# Patient Record
Sex: MALE | Race: WHITE | NOT HISPANIC OR LATINO | URBAN - METROPOLITAN AREA
[De-identification: names, ages, dates, MRNs, and addresses within clinical notes are randomized per-mention and may not be internally consistent; named-entity substitution may affect disease eponyms.]

---

## 2017-01-04 ENCOUNTER — EMERGENCY (EMERGENCY)
Facility: HOSPITAL | Age: 67
LOS: 0 days | Discharge: HOME | End: 2017-01-04

## 2017-06-27 DIAGNOSIS — R55 SYNCOPE AND COLLAPSE: ICD-10-CM
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
## 2017-06-27 DIAGNOSIS — Z91.81 HISTORY OF FALLING: ICD-10-CM

## 2017-06-27 DIAGNOSIS — I10 ESSENTIAL (PRIMARY) HYPERTENSION: ICD-10-CM

## 2017-06-27 DIAGNOSIS — S00.81XA ABRASION OF OTHER PART OF HEAD, INITIAL ENCOUNTER: ICD-10-CM
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
## 2017-06-27 DIAGNOSIS — I48.91 UNSPECIFIED ATRIAL FIBRILLATION: ICD-10-CM

## 2017-06-27 DIAGNOSIS — E03.9 HYPOTHYROIDISM, UNSPECIFIED: ICD-10-CM

## 2023-02-03 ENCOUNTER — APPOINTMENT (OUTPATIENT)
Dept: ORTHOPEDIC SURGERY | Facility: CLINIC | Age: 73
End: 2023-02-03
Payer: MEDICARE

## 2023-02-03 VITALS — BODY MASS INDEX: 23.1 KG/M2 | HEIGHT: 74 IN | WEIGHT: 180 LBS

## 2023-02-03 PROBLEM — Z00.00 ENCOUNTER FOR PREVENTIVE HEALTH EXAMINATION: Status: ACTIVE | Noted: 2023-02-03

## 2023-02-03 PROCEDURE — 99203 OFFICE O/P NEW LOW 30 MIN: CPT

## 2023-02-03 PROCEDURE — 72110 X-RAY EXAM L-2 SPINE 4/>VWS: CPT

## 2023-02-03 NOTE — IMAGING
[de-identified] :   Pleasant middle-aged gentleman he is able to stand without assistance but he has to utilize his center gravity to stand up of leaning forward.  \par \par Lumbar spine:  Well-healed surgical scars.  No tenderness palpation about surgical sites.  No gross deformity.  No tenderness along the bony prominences.  Motor strength about his lower extremities does demonstrate some mild decrease is with 5-out of 5 strength and his ileus psoas quadriceps and hamstring muscles.  He is unable to heel or toe walk secondary to balance issues.  Motor strength in these muscle groups is the same.  Grossly intact sensation over the anterior aspect of his legs and thighs subjective decreased sensation on the plantar aspect of his foot.\par \par Radiographs:\par Lumbar spine (AP, lateral, obliques):  Retained plate fixation between L3 and L5 with evidence of fusion mass most likely between L3-L4.  There vertical syndesmophytes noted between L1 and L2.  Facet arthritis is noted about the length of lumbar spine.  Some flattening of the normal lordosis.  Generalized disc space narrowing throughout the whole lumbar spine.

## 2023-02-03 NOTE — ASSESSMENT
[FreeTextEntry1] :   73-year-old gentleman with extensive neuro surgical history with symptoms consistent with spinal stenosis.  I would like him evaluated by Dr. Karel Brito and possibly Neurology.  Patient would probably benefit from an MRI with gadolinium enhancement.  Try to make arrangements for the study prior to his visit with Dr. Brito .  I explained my concerns and the plan to the patient.  Answered questions to his satisfaction and agrees with the plan.

## 2023-02-03 NOTE — HISTORY OF PRESENT ILLNESS
[de-identified] :  73-year-old gentleman presents this office for evaluation of bilateral lower extremity weakness.  Is extensive in neuro surgical history.  Previously has undergone laminectomies and lumbar spine fusions for weakness in his lower extremities by Dr. Marciano Michaels and subsequent by Dr. Vargas Rendon.  Patient reports that recently he is developing weakness is lower extremities along with lasting history of decreased sensation about the lateral aspect of his feet.  He walks without a steady gait.  He notes that he had similar symptoms prior to his last surgery.  He is concerned about recurrence.

## 2023-02-13 ENCOUNTER — APPOINTMENT (OUTPATIENT)
Dept: ORTHOPEDIC SURGERY | Facility: CLINIC | Age: 73
End: 2023-02-13
Payer: MEDICARE

## 2023-02-13 PROCEDURE — 99214 OFFICE O/P EST MOD 30 MIN: CPT

## 2023-02-13 NOTE — PHYSICAL EXAM
[de-identified] : TTP midline spine and paraspinal musculature \par Strength                                         \par Hip flexor\par   Right: 5/5; Left: 5/5                             \par Knee extensor  \par   Right: 5/5; Left: 5/5                     \par Ankle dorsiflexion\par   Right: 5/5; Left: 5/5                  \par EHL        \par   Right: 5/5; Left: 5/5                                \par Ankle plantarflexion    \par   Right: 5/5; Left: 5/5\par \par Sensation\par L1\par   Right: 2/2; Left: 2/2\par L2\par   Right: 2/2; Left: 2/2\par L3\par   Right: 2/2; Left: 2/2\par L4\par   Right: 2/2; Left: 2/2\par L5\par   Right: 2/2; Left: 2/2\par S1\par   Right: 2/2; Left: 2/2\par \par Reflexes\par Patella\par   Right: 2+; Left 2+\par Achilles\par   Right: 2+; Left 2+\par Clonus\par  Right: absent; L: absent\par

## 2023-02-13 NOTE — DISCUSSION/SUMMARY
[de-identified] : 73-year-old male with symptoms of recurrent spinal stenosis.  At this point I will order an MRI of the patient's lumbar spine for evaluation.  I did discuss with the patient that if his symptoms do not significantly interfere with his day-to-day I do not necessarily recommend a revision posterior lumbar surgery.  I will see the patient back after the MRI to go over the results and see what the next step will be.

## 2023-02-13 NOTE — DATA REVIEWED
[FreeTextEntry1] : I reviewed AP lateral oblique lumbar x-rays done on 2/3/2023.  This demonstrates an instrumented fusion at L3-5.  There are some mild adjacent segment disease at L2-3 there is also degenerative disc disease at L5-S1.

## 2023-02-13 NOTE — HISTORY OF PRESENT ILLNESS
[de-identified] : 73-year-old female presents with lower extremity symptoms.  Of note the patient in 2008 had a laminectomy and fusion done.  Prior to the surgery he had sensation of his legs giving way and weakness in his legs.  That resolved however it is recurring.  At this point it does not bother him significantly enough for surgery however he is worried about it getting worse.  He states that in the morning he wakes up and he has trouble walking because his legs feel heavy and weak.  As the day goes that improves.  He also has numbness and tingling in the morning but that goes away as the day goes.  He denies loss of bladder or bowel.  He does not do physical therapy would not like to do physical therapy.  He states that he is fairly active otherwise.  He denies a significant amount of low back pain.  He has no advanced imaging.

## 2023-02-23 ENCOUNTER — APPOINTMENT (OUTPATIENT)
Dept: MRI IMAGING | Facility: CLINIC | Age: 73
End: 2023-02-23
Payer: MEDICARE

## 2023-02-23 PROCEDURE — 72158 MRI LUMBAR SPINE W/O & W/DYE: CPT | Mod: MH

## 2023-03-08 ENCOUNTER — APPOINTMENT (OUTPATIENT)
Dept: PAIN MANAGEMENT | Facility: CLINIC | Age: 73
End: 2023-03-08
Payer: MEDICARE

## 2023-03-08 ENCOUNTER — APPOINTMENT (OUTPATIENT)
Dept: ORTHOPEDIC SURGERY | Facility: CLINIC | Age: 73
End: 2023-03-08
Payer: MEDICARE

## 2023-03-08 VITALS — WEIGHT: 180 LBS | BODY MASS INDEX: 23.1 KG/M2 | HEIGHT: 74 IN

## 2023-03-08 DIAGNOSIS — R29.898 OTHER SYMPTOMS AND SIGNS INVOLVING THE MUSCULOSKELETAL SYSTEM: ICD-10-CM

## 2023-03-08 DIAGNOSIS — M54.16 RADICULOPATHY, LUMBAR REGION: ICD-10-CM

## 2023-03-08 PROCEDURE — 99214 OFFICE O/P EST MOD 30 MIN: CPT

## 2023-03-08 PROCEDURE — 99204 OFFICE O/P NEW MOD 45 MIN: CPT

## 2023-03-08 NOTE — PHYSICAL EXAM
[de-identified] : BACK - tenderness into the lumbar paraspinals. ROM Restricted. Pain with flexion. Positive SLR on the right.

## 2023-03-08 NOTE — HISTORY OF PRESENT ILLNESS
[de-identified] : 73-year-old male presents to me with neurogenic claudication.  He is here for me to review the MRI of his lumbar spine he had October 23, 2023.  The patient states his symptoms have been getting worse.  When he walks his legs feel dead to him.  The longer he walks the worse it is.  When he sits down the symptoms resolved.  When he starts walking the symptoms resume.  At the supermarket he leans forward on a shopping cart he can walk longer distances.  He has not tried any injections yet.

## 2023-03-08 NOTE — DATA REVIEWED
[FreeTextEntry1] : MRI of the lumbar spine taken on 02/23/2023 Impression: The patient is status post laminectomy at L3 and L4 with posterior fixation with pedicle screws within the bodies of L3 and L5. A small central disc herniation is present at L3-4. Mild to moderate lumbar stenosis is seen at L2-3.

## 2023-03-08 NOTE — DATA REVIEWED
[FreeTextEntry1] : I reviewed the patient's MRI of his lumbar spine from February 23, 2023 done at our facility.  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  Open spinal canal at the levels of his prior laminectomy.

## 2023-03-08 NOTE — HISTORY OF PRESENT ILLNESS
[FreeTextEntry1] : HISTORY OF PRESENT ILLNESS: Mr. Shah is a 73 year old male complaining of weakness and tiredness in his legs.  He states he feels unsteady when he ambulates for any appreciable amount of time.  He states he also has pain which radiates into the right lower extremity.  He does also gives history of having prior lumbar spine surgery and fusion.  He recently saw a spine surgeon and no current surgical interventions are planned.\par \par The pain started after no inciting event.  The patient has had this pain for 1 year.  Patient describes the pain as moderate.  During the last month the pain has been less than 30% of time with symptoms worsening night. Pain described as shooting.  Pain is increased with lying down.  Bowel or bladder habits have not changed.\par  \par ACTIVITIES: Patient could walk 4 blocks before the pain starts.  Patient can sit many hours before pain starts.  Patient can stand 1 hour before pain starts. Patient uses no assistive walking device at this time.  Patient has difficulty doing outside work or shopping, participating in recreational activities and exercising at this time.\par \par PRIOR PAIN TREATMENTS:  No prior pain treatments noted.\par \par Prior Pain Medications:  None mentioned.\par

## 2023-03-08 NOTE — DISCUSSION/SUMMARY
[de-identified] : 73-year-old male presents with adjacent segment disease and spinal stenosis at L2-3 causing neurogenic claudication.  The patient states that his leg pain bothers him more than any of his back pain.  I discussed with the patient that the surgical option would be a lumbar laminectomy and extension of his fusion to L2.  I would like the patient to try an epidural steroid injection prior to proceeding with any kind of surgical intervention.  The patient agrees that he will follow-up with Dr. Hernandez.  I will also send the patient for full-length scoliosis x-rays.

## 2023-03-08 NOTE — ASSESSMENT
[FreeTextEntry1] : 73 year old male presenting with severe lumbar radicular pain. Patient is presenting with radicular pain with impairment in ADLs and functionality.  The pain has not responded to conservative care, including medications, stretching, as well as active modalities, such as physical therapy.  Imaging studies as well as physical exam findings corroborate the symptomatology and radicular pain.  We will proceed with an epidural at this point. I will proceed with a Bilateral L2-3 transforaminal epidural steroid injection with sedation. He is currently taking Xarelto. In order to proceed with this injection, I will reach out to his cardiologist for clearance. I will also have the patient receive a Lumbar LSO brace to reduce pain by restricting mobility of the trunk. Follow up in 6 weeks will be made for reassessment. I have explained the findings to the patient and all questions have been answered. \par \par Bilateral L2-3 transforaminal epidural steroid injection with sedation.\par \par Patient had a MRI that shows a radicular component along with pain referred into the lower extremity. Patient has trialed rehab (Home exercise, physical therapy or chiropractic care) and medications I will schedule a L2-3 SNRI. \par \par Risk, benefits, pros and cons of procedure were explained to the patient using models and diagrams and their questions were answered. \par \par \par The patient has severe anxiety of procedures that necessitates monitored anesthesia care (MAC). The procedure performed will be close to major nerves, arteries, and spinal cord and/or joint structures. Due to the proximity of these structures, we need the patient to be still during the procedure.  With the help of MAC, this will be safely achieved and decrease the risk of any complications.\par \par Patient has pain in spinal movement along with weakness in extension and flexion. I will put in for a lumbar brace with lateral supports to be worn no more than 4 hours a day and 2 hours in a row to decrease facet and disc load, to decrease pain, increase activity, increase function, to reduce pain by restricting mobility of the trunk, to facilitate healing of the spine and related Soft tissues and to support weak spinal muscles used to help the patient with rehab and home exercise program stressing walking.  Other exercises discussed include swimming, elliptical , recumbent bike, Dru chi and Yoga. Use things that heat like hot shower or icy heat before rehab and exercising and at the beginning of the day, and ice (ice in a bag never directly on the skin) after activity and at the end of the day.\par \par Entered by Corie Sparks, acting as scribe for Dr. Hernandez.\par  \par The documentation recorded by the scribe, in my presence, accurately reflects the service I personally performed, and the decisions made by me with my edits as appropriate.\par  \par Best Regards, \par Pierre Hernandez MD \par Board Certified, Anesthesiology \par Board Certified, Pain Medicine\par \par

## 2023-03-10 ENCOUNTER — OUTPATIENT (OUTPATIENT)
Dept: OUTPATIENT SERVICES | Facility: HOSPITAL | Age: 73
LOS: 1 days | End: 2023-03-10
Payer: MEDICARE

## 2023-03-10 ENCOUNTER — RESULT REVIEW (OUTPATIENT)
Age: 73
End: 2023-03-10

## 2023-03-10 DIAGNOSIS — R29.898 OTHER SYMPTOMS AND SIGNS INVOLVING THE MUSCULOSKELETAL SYSTEM: ICD-10-CM

## 2023-03-10 DIAGNOSIS — Z00.8 ENCOUNTER FOR OTHER GENERAL EXAMINATION: ICD-10-CM

## 2023-03-10 PROCEDURE — 72110 X-RAY EXAM L-2 SPINE 4/>VWS: CPT | Mod: 26

## 2023-03-10 PROCEDURE — 72110 X-RAY EXAM L-2 SPINE 4/>VWS: CPT

## 2023-03-11 DIAGNOSIS — R29.898 OTHER SYMPTOMS AND SIGNS INVOLVING THE MUSCULOSKELETAL SYSTEM: ICD-10-CM

## 2023-04-05 ENCOUNTER — APPOINTMENT (OUTPATIENT)
Dept: ORTHOPEDIC SURGERY | Facility: CLINIC | Age: 73
End: 2023-04-05

## 2023-04-06 ENCOUNTER — APPOINTMENT (OUTPATIENT)
Dept: PAIN MANAGEMENT | Facility: CLINIC | Age: 73
End: 2023-04-06
Payer: MEDICARE

## 2023-04-06 PROCEDURE — 00630 ANES PX LUMBAR REGION NOS: CPT | Mod: QZ,P3

## 2023-04-06 PROCEDURE — 72100 X-RAY EXAM L-S SPINE 2/3 VWS: CPT

## 2023-04-06 PROCEDURE — 94761 N-INVAS EAR/PLS OXIMETRY MLT: CPT | Mod: 59

## 2023-04-06 PROCEDURE — 64483 NJX AA&/STRD TFRM EPI L/S 1: CPT | Mod: 50

## 2023-04-06 PROCEDURE — 93040 RHYTHM ECG WITH REPORT: CPT | Mod: 59

## 2023-04-06 PROCEDURE — 93770 DETERMINATION VENOUS PRESS: CPT

## 2023-04-06 NOTE — PROCEDURE
[FreeTextEntry1] : SELECTIVE TRANSFORAMINAL LUMBAR L2-3 EPIDURAL NERVE ROOT INJECTION UNDER FLUOROSCOPY [FreeTextEntry3] : SELECTIVE TRANSFORAMINAL LUMBAR L2-3 EPIDURAL NERVE ROOT INJECTION UNDER FLUOROSCOPY\par \par \par \par \par Date:  2023\par \par Patient: Theodore Shah\par \par :  1950\par Preoperative Diagnosis: Lumbar Radiculopathy \par \par \par \par \par \par Procedure: \par 1. Selective Bilateral L2-3 Transforaminal Lumbar Epidural Nerve Root Injection under Fluoroscopy\par 2. Epidurography\par 3. Fluoroscopic guidance and localization of needle \par \par \par \par \par \par Physician: Pierre Hernandez M.D.\par Anesthesiologist/CRNA:  Mr. Hicks \par Anesthesia: MAC/cold spray, see nurses note. IV Sedation versed 1mg, fentanyl 100 mcg\par Medical Necessity:  Failure of conservative management.\par Consent:  Though unusual, the possible complications including infection, bleeding, nerve damage, hospital admission, stroke, pneumothorax, death or failure of the procedure are theoretically possible. The patient was educated about the of the procedure and alternative therapies. All questions were answered and the patient freely gave consent to proceed.\par Indication for Fluoroscopy:  This procedure requires the precise placement of the spinal needle into the epidural space.  It is the only way to accurately and safely perform the injection.\par \par \par \par \par PROCEDURE NOTE: \par After obtaining written consent, the patient was then positioned on the fluoroscopy table in the prone position with a pillow beneath the pelvis to reduce lumbar lordosis. The lumbar area was prepped with chloraprep solution and draped in the usual manner. A time out was performed. The fluoroscope was used to identify the L3///L4///L5 vertebral body on the AP projection. It was then rotated into an oblique projection until the superior articulating process of the L3 (inferior) vertebra is projected beneath the 6 o'clock position of the L2 (superior) vertebrae. The 22 gauge 3.5inch needle was inserted in the skin at a point overlying the superior articulating process of the inferior vertebra and aimed for the 6 o'clock position of the superior vertebrae's pedicle.  After the needle contacted bone, a lateral projection was obtained to insure that the needle tip was in proximity with the vertebral body. Paresthesias were not noted.  One ml of Omnipaque 240 was injected and a neurogram was obtained. Following demonstration of the neurogram, 1 ml of Preservative free normal saline and 1 ml of dexamethasone (10mg) was injected. The small volume and relatively high concentration was chosen to preserve selectivity and diagnostic value of the injection. There was no CSF nor heme identified. The contralateral side was injected in identical fashion.\par \par \par Epidurogram: The nerve root was observed in its outline on the neurogram. Distal and proximal spread was noted.\par Findings: Lumbar Spine AP and oblique views with x-ray degenerative changes noted.\par \par Complications: none.  \par \par Disposition: I have examined the patient and there are no new physical findings since original presentation. The patient was discharged home with a . The discharge instruction sheet was given to the patient. Motor function was intact.\par \par Comment: 1st TFESI today, depending on effectiveness would schedule 2nd TFESI in 1-2 weeks vs caudal epidural steroid vs follow up in office. Call if any problems\par \par \par \par \par This document was signed by:\par Pierre Hernandez MD  \par Board Certified, Anesthesiology  \par Board Certified, Pain Medicine  \par \par

## 2023-04-13 ENCOUNTER — APPOINTMENT (OUTPATIENT)
Dept: PAIN MANAGEMENT | Facility: CLINIC | Age: 73
End: 2023-04-13

## 2023-04-13 ENCOUNTER — APPOINTMENT (OUTPATIENT)
Dept: PAIN MANAGEMENT | Facility: CLINIC | Age: 73
End: 2023-04-13
Payer: MEDICARE

## 2023-04-13 PROCEDURE — 93770 DETERMINATION VENOUS PRESS: CPT

## 2023-04-13 PROCEDURE — 64483 NJX AA&/STRD TFRM EPI L/S 1: CPT | Mod: 50

## 2023-04-13 PROCEDURE — 93040 RHYTHM ECG WITH REPORT: CPT | Mod: 59

## 2023-04-13 PROCEDURE — 72100 X-RAY EXAM L-S SPINE 2/3 VWS: CPT

## 2023-04-13 PROCEDURE — 99152Z: CUSTOM

## 2023-04-13 NOTE — PROCEDURE
[FreeTextEntry1] : SELECTIVE TRANSFORAMINAL LUMBAR L2-3 EPIDURAL NERVE ROOT INJECTION UNDER FLUOROSCOPY [FreeTextEntry3] : SELECTIVE TRANSFORAMINAL LUMBAR L2-3 EPIDURAL NERVE ROOT INJECTION UNDER FLUOROSCOPY\par \par \par \par \par Date:  2023\par \par Patient: Theodore Shah\par \par :  1950\par Preoperative Diagnosis: Lumbar Radiculopathy \par \par \par \par \par \par Procedure: \par 1. Selective Bilateral L2-3 Transforaminal Lumbar Epidural Nerve Root Injection under Fluoroscopy\par 2. Epidurography\par 3. Fluoroscopic guidance and localization of needle \par \par \par \par \par \par Physician: Pierre Hernandez M.D.\par Anesthesia: See nurses note. MAC/ cold spray. IV Sedation versed 1mg, fentanyl 100mcg\par Medical Necessity:  Failure of conservative management.\par Consent:  Though unusual, the possible complications including infection, bleeding, nerve damage, hospital admission, stroke, pneumothorax, death or failure of the procedure are theoretically possible. The patient was educated about the of the procedure and alternative therapies. All questions were answered and the patient freely gave consent to proceed.\par Indication for Fluoroscopy:  This procedure requires the precise placement of the spinal needle into the epidural space.  It is the only way to accurately and safely perform the injection.\par \par \par \par \par PROCEDURE NOTE: \par After obtaining written consent, the patient was then positioned on the fluoroscopy table in the prone position with a pillow beneath the pelvis to reduce lumbar lordosis. The lumbar area was prepped with chloraprep solution and draped in the usual manner. A time out was performed. The fluoroscope was used to identify the L3///L4///L5 vertebral body on the AP projection. It was then rotated into an oblique projection until the superior articulating process of the L3 (inferior) vertebra is projected beneath the 6 o'clock position of the L2 (superior) vertebrae. The 22 gauge 3.5inch needle was inserted in the skin at a point overlying the superior articulating process of the inferior vertebra and aimed for the 6 o'clock position of the superior vertebrae's pedicle.  After the needle contacted bone, a lateral projection was obtained to insure that the needle tip was in proximity with the vertebral body. Paresthesias were not noted.  One ml of Omnipaque 240 was injected and a neurogram was obtained. Following demonstration of the neurogram, 1 ml of Preservative free normal saline and 1 ml of dexamethasone (10mg) was injected. The small volume and relatively high concentration was chosen to preserve selectivity and diagnostic value of the injection. There was no CSF nor heme identified. The contralateral side was injected in identical fashion.\par \par \par Epidurogram: The nerve root was observed in its outline on the neurogram. Distal and proximal spread was noted.\par Findings: Lumbar Spine AP and oblique views with x-ray degenerative changes noted.\par \par Complications: none.  \par \par Disposition: I have examined the patient and there are no new physical findings since original presentation. The patient was discharged home with a . The discharge instruction sheet was given to the patient. Motor function was intact.\par \par Comment: 2ndTFESI today, depending on effectiveness would schedule 3rd TFESI in 1-2 weeks vs caudal epidural steroid vs follow up in office. Call if any problems\par \par \par \par \par This document was signed by:\par Pierre Hernandez MD  \par Board Certified, Anesthesiology  \par Board Certified, Pain Medicine  \par \par

## 2023-04-14 ENCOUNTER — APPOINTMENT (OUTPATIENT)
Dept: ORTHOPEDIC SURGERY | Facility: CLINIC | Age: 73
End: 2023-04-14
Payer: MEDICARE

## 2023-04-14 PROCEDURE — 99213 OFFICE O/P EST LOW 20 MIN: CPT

## 2023-04-14 NOTE — HISTORY OF PRESENT ILLNESS
[de-identified] : 73-year-old male returns for follow-up.  He has had 2 injections and has gotten some relief albeit incomplete in terms of his lower extremity symptoms.  Overall he is pleased with the progress.  He has 1/3 injection scheduled and he would like to see what happens in terms of his pain relief before deciding on any kind of surgical intervention.  I agree I think this is a reasonable plan.

## 2023-04-14 NOTE — DISCUSSION/SUMMARY
[de-identified] : 73-year-old male with adjacent segment disease and spinal stenosis causing neurogenic claudication L2-3.  I will see the patient back in about 6 weeks to check on his progress.  If he is having substantial relief after the completion of his injections we will hold off on any Surgical intervention.  If he still having difficulty walking in a lot of pain then we will likely proceed to surgery.  He will see me sooner if symptoms worsen sooner.

## 2023-04-27 ENCOUNTER — APPOINTMENT (OUTPATIENT)
Dept: PAIN MANAGEMENT | Facility: CLINIC | Age: 73
End: 2023-04-27
Payer: MEDICARE

## 2023-04-27 PROCEDURE — 93040 RHYTHM ECG WITH REPORT: CPT | Mod: XS

## 2023-04-27 PROCEDURE — 62323 NJX INTERLAMINAR LMBR/SAC: CPT

## 2023-04-27 PROCEDURE — 72100 X-RAY EXAM L-S SPINE 2/3 VWS: CPT

## 2023-04-27 PROCEDURE — 93770 DETERMINATION VENOUS PRESS: CPT | Mod: 59

## 2023-04-27 PROCEDURE — 00630 ANES PX LUMBAR REGION NOS: CPT | Mod: QZ,P3

## 2023-04-27 PROCEDURE — 94761 N-INVAS EAR/PLS OXIMETRY MLT: CPT

## 2023-04-27 NOTE — PROCEDURE
[FreeTextEntry1] : Interlaminar Lumbar Epidural Steroid Injection  [FreeTextEntry3] : Interlaminar Lumbar Epidural Steroid Injection \par  \par \par Date:  2023\par \par Patient: Theodore Shah \par \par :  1950\par \par \par Preoperative Diagnosis: Lumbar Radiculopathy \par Postoperative Diagnosis: Lumbar Radiculopathy \par \par \par \par \par \par Procedure: \par 1. Interlaminar L2-3 Lumbar Epidural Injection under fluoroscopy\par 2. Epidurography\par 3. Fluoroscopic guidance and localization of needle \par \par \par \par \par \par Physician: Pierre Hernandez M.D.\par Anesthesiologist/CRNA:  Ms. Clark\par Anesthesia: See nurses note/MAC/cold spray IV Sedation versed 1mg, fentanyl 100mcg \par Medical Necessity:  Failure of conservative management.\par Consent:  Though unusual, the possible complications including infection, bleeding, nerve damage, hospital admission, stroke, pneumothorax, death or failure of the procedure are theoretically possible. The patient was educated about the of the procedure and alternative therapies. All questions were answered and the patient freely gave consent to proceed.\par Indication for Fluoroscopy:  This procedure requires the precise placement of the spinal needle into the epidural space.  It is the only way to accurately and safely perform the injection.\par \par \par \par \par PROCEDURE NOTE:  After obtaining written consent, in the sitting position an IV was placed in the upper extremity by the CRNA. The patient was placed in the prone position. The lumbosacral region was prepped with chloraprep and draped in usual sterile fashion. A time out was performed.  The L2-3 interspace was identified using fluoroscopy. The skin was infiltrated with lidocaine 2% -- 2mL for subcutaneous analgesia. The epidural space was identified using a 17g touhy needle with a midline approach using a loss of resistance technique. 2mL omnipaque was used to define the space. A solution of 6ml of preservative-free sterile saline and 1ml of depomedrol 80mg was infused in 1mL increments slowly with minimal pressure on the syringe into the epidural space. The procedure was tolerated well. There was no evidence of CSF, paresthesias, or heme.  \par  \par Epidurogram: Distal and proximal spread was noted.\par \par Findings: Lumbar Spine AP and oblique views with x-ray degenerative changes noted.\par \par Complications: none.  \par \par Disposition: I have examined the patient and there are no new physical findings since original presentation. The patient was discharged home with a . The discharge instruction sheet was given to the patient. Motor function was intact.\par \par Comment: 1st Interlaminar LISA today, depending on effectiveness would schedule 2nd Interlaminar in 1-2 weeks vs caudal epidural steroid vs follow up in office. Call if any problems\par \par \par \par \par This document was signed by: \par \par Pierre Hernandez MD  \par Board Certified, Anesthesiology  \par Board Certified, Pain Medicine  \par \par \par

## 2023-05-04 ENCOUNTER — APPOINTMENT (OUTPATIENT)
Dept: PAIN MANAGEMENT | Facility: CLINIC | Age: 73
End: 2023-05-04
Payer: MEDICARE

## 2023-05-04 PROCEDURE — 94761 N-INVAS EAR/PLS OXIMETRY MLT: CPT

## 2023-05-04 PROCEDURE — 93040 RHYTHM ECG WITH REPORT: CPT | Mod: 59

## 2023-05-04 PROCEDURE — 93770 DETERMINATION VENOUS PRESS: CPT

## 2023-05-04 PROCEDURE — 00630 ANES PX LUMBAR REGION NOS: CPT | Mod: QZ,P3

## 2023-05-04 PROCEDURE — 62323 NJX INTERLAMINAR LMBR/SAC: CPT

## 2023-05-04 PROCEDURE — 72100 X-RAY EXAM L-S SPINE 2/3 VWS: CPT

## 2023-05-04 NOTE — PROCEDURE
[FreeTextEntry1] : Interlaminar Lumbar Epidural Steroid Injection [FreeTextEntry3] : Date:  2023\par \par Patient: Theodore Shah\par \par :  1950\par \par \par \par \par \par Preoperative Diagnosis: Lumbar Radiculopathy\par Postoperative Diagnosis: Lumbar Radiculopathy\par \par \par \par Procedure:\par 1. Interlaminar L2-3 Lumbar Epidural Injection under fluoroscopy\par 2. Epidurography\par 3. Fluoroscopic guidance and localization of needle\par \par \par \par Physician: Pierre Hernandez M.D.\par Anesthesiologist/CRNA: Mr. Hicks \par Anesthesia: MAC, Versed 2mg, Fentanyl 150 mcg\par Medical Necessity:  Failure of conservative management.\par Consent:  Though unusual, the possible complications including infection, bleeding, nerve damage, hospital admission, stroke, pneumothorax, death or failure of the procedure are theoretically possible. The patient was educated about the of the procedure and alternative therapies. All questions were answered and the patient freely gave consent to proceed.\par Indication for Fluoroscopy:  This procedure requires the precise placement of the spinal needle into the epidural space.  It is the only way to accurately and safely perform the injection.\par \par \par \par PROCEDURE NOTE:  After obtaining written consent, in the sitting position an IV was placed in the upper extremity by the CRNA. The patient was placed in the prone position. The lumbosacral region was prepped with betadine and draped in usual sterile fashion. A time out was performed.  The L2-3 interspace was identified using fluoroscopy. The skin was infiltrated with lidocaine 2% -- 2mL for subcutaneous analgesia. The epidural space was identified using a 17g touhy needle with a midline approach using a loss of resistance technique. 2mL omnipaque was used to define the space. A solution of 6ml of preservative-free sterile saline and 1ml of depomedrol 80mg was infused in 1mL increments slowly with minimal pressure on the syringe into the epidural space. The procedure was tolerated well. There was no evidence of CSF, paresthesias, or heme.  \par  \par Epidurogram: Distal and proximal spread was noted.\par \par Findings: Lumbar Spine AP and oblique views with x-ray degenerative changes noted.\par \par Complications: none. \par \par Disposition: I have examined the patient and there are no new physical findings since original presentation. The patient was discharged home with a . The discharge instruction sheet was given to the patient. Motor function was intact.\par \par Comment: 2nd Interlaminar LISA today, depending on effectiveness would schedule 3rd Interlaminar in 1-2 weeks vs caudal epidural steroid vs follow up in office. Call if any problems\par \par \par \par \par \par Pierre Hernandez MD \par Board Certified, Anesthesiology \par Board Certified, Pain Medicine \par \par

## 2023-05-18 ENCOUNTER — APPOINTMENT (OUTPATIENT)
Dept: PAIN MANAGEMENT | Facility: CLINIC | Age: 73
End: 2023-05-18
Payer: MEDICARE

## 2023-05-18 VITALS — WEIGHT: 180 LBS | HEIGHT: 74 IN | BODY MASS INDEX: 23.1 KG/M2

## 2023-05-18 DIAGNOSIS — M96.1 POSTLAMINECTOMY SYNDROME, NOT ELSEWHERE CLASSIFIED: ICD-10-CM

## 2023-05-18 PROCEDURE — 99214 OFFICE O/P EST MOD 30 MIN: CPT

## 2023-05-18 NOTE — ASSESSMENT
[FreeTextEntry1] : 73 year old male who essentially failed lumbar epidural steroid injections.  He is scheduled to see spine surgery.  I will have him follow up with me in 2 months for reassessment.\par \par \par Entered by Corie Sparks, acting as scribe for Dr. Hernandez.\par  \par The documentation recorded by the scribe, in my presence, accurately reflects the service I personally performed, and the decisions made by me with my edits as appropriate.\par  \par Best Regards, \par Pierre Hernandez MD \par Board Certified, Anesthesiology \par Board Certified, Pain Medicine\par \par

## 2023-05-18 NOTE — HISTORY OF PRESENT ILLNESS
[FreeTextEntry1] : HISTORY OF PRESENT ILLNESS: Mr. Shah is a 73 year old male complaining of weakness and tiredness in his legs.  He states he feels unsteady when he ambulates for any appreciable amount of time.  He states he also has pain which radiates into the right lower extremity.  He does also gives history of having prior lumbar spine surgery and fusion.  He recently saw a spine surgeon and no current surgical interventions are planned.\par \par The pain started after no inciting event.  The patient has had this pain for 1 year.  Patient describes the pain as moderate.  During the last month the pain has been less than 30% of time with symptoms worsening night. Pain described as shooting.  Pain is increased with lying down.  Bowel or bladder habits have not changed.\par  \par ACTIVITIES: Patient could walk 4 blocks before the pain starts.  Patient can sit many hours before pain starts.  Patient can stand 1 hour before pain starts. Patient uses no assistive walking device at this time.  Patient has difficulty doing outside work or shopping, participating in recreational activities and exercising at this time.\par \par PRIOR PAIN TREATMENTS:  No prior pain treatments noted.\par \par Prior Pain Medications:  None mentioned.\par \par TODAY: Since last visit, he underwent a L2-3 interlaminar epidural steroid injection on 5-4-2023 He states the injections helped minimally with his leg weakness.  He continues today with severe spasms and weakness in the leg with ambulation.  He states the pain is present daily and with any sort of activity.  He is very frustrated by his symptoms.

## 2023-05-18 NOTE — PHYSICAL EXAM
[de-identified] : BACK - tenderness into the lumbar paraspinals. ROM Restricted. Pain with flexion. Positive SLR on the right.

## 2023-06-02 ENCOUNTER — APPOINTMENT (OUTPATIENT)
Dept: ORTHOPEDIC SURGERY | Facility: CLINIC | Age: 73
End: 2023-06-02
Payer: MEDICARE

## 2023-06-02 PROCEDURE — 72100 X-RAY EXAM L-S SPINE 2/3 VWS: CPT

## 2023-06-02 PROCEDURE — 99214 OFFICE O/P EST MOD 30 MIN: CPT

## 2023-06-02 NOTE — DISCUSSION/SUMMARY
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  He has failed conservative care.  I discussed surgical options with him.  I recommend an L2-3 laminectomy.  The goal of the surgery would be relief of his claudicant symptoms.  He would still have back pain.  He is more bothered by the claudicant symptoms in the back pain.  I discussed risks including infection, wound healing, adjacent segment disease, iatrogenic instability, need for revision surgery, hematoma, bleeding, dural tear, nerve injury, loss of bladder or bowel, organ damage, blood clots, and even death.  I also discussed with the patient that doing a decompression would have a faster recovery with less risks than doing an extension of fusion which would require revision of his old hardware.  I did discuss with him that it is possible that he develops recurrent stenosis and instability and if that is the case then we can always go back to do an instrumented fusion on him however he has no instability on flexion-extension right now and I think a decompression would provide him the maximal benefit given his age and given the recovery from a fusion.  I am also ordering a CT scan of the lumbar spine to look at the bony anatomy.  The patient would like to proceed with surgery.  We will book him for [facility].  I provided him with the standardized information packet.

## 2023-06-02 NOTE — HISTORY OF PRESENT ILLNESS
[de-identified] : 73-year-old male returns for follow-up.  He has had a few injections and some of his pain improved however his symptoms started to recur.  His most significant symptoms are the heaviness and weakness he feels in his legs.  When he walks his weakness in his legs worsens.  When he sits down it improves.  He likes to lean forward at the supermarket and that helps his symptoms.  This is how he felt before his prior surgery.  It is interfering with his day-to-day life.

## 2023-06-06 ENCOUNTER — RESULT REVIEW (OUTPATIENT)
Age: 73
End: 2023-06-06

## 2023-06-06 ENCOUNTER — OUTPATIENT (OUTPATIENT)
Dept: OUTPATIENT SERVICES | Facility: HOSPITAL | Age: 73
LOS: 1 days | End: 2023-06-06
Payer: MEDICARE

## 2023-06-06 VITALS
TEMPERATURE: 99 F | WEIGHT: 199.96 LBS | HEIGHT: 74 IN | OXYGEN SATURATION: 99 % | HEART RATE: 94 BPM | DIASTOLIC BLOOD PRESSURE: 57 MMHG | SYSTOLIC BLOOD PRESSURE: 97 MMHG | RESPIRATION RATE: 16 BRPM

## 2023-06-06 DIAGNOSIS — Z98.890 OTHER SPECIFIED POSTPROCEDURAL STATES: Chronic | ICD-10-CM

## 2023-06-06 DIAGNOSIS — Z01.818 ENCOUNTER FOR OTHER PREPROCEDURAL EXAMINATION: ICD-10-CM

## 2023-06-06 DIAGNOSIS — M54.16 RADICULOPATHY, LUMBAR REGION: ICD-10-CM

## 2023-06-06 DIAGNOSIS — Z98.1 ARTHRODESIS STATUS: Chronic | ICD-10-CM

## 2023-06-06 LAB
ALBUMIN SERPL ELPH-MCNC: 4.7 G/DL — SIGNIFICANT CHANGE UP (ref 3.5–5.2)
ALP SERPL-CCNC: 65 U/L — SIGNIFICANT CHANGE UP (ref 30–115)
ALT FLD-CCNC: 16 U/L — SIGNIFICANT CHANGE UP (ref 0–41)
ANION GAP SERPL CALC-SCNC: 16 MMOL/L — HIGH (ref 7–14)
APTT BLD: 37.2 SEC — SIGNIFICANT CHANGE UP (ref 27–39.2)
AST SERPL-CCNC: 27 U/L — SIGNIFICANT CHANGE UP (ref 0–41)
BASOPHILS # BLD AUTO: 0.08 K/UL — SIGNIFICANT CHANGE UP (ref 0–0.2)
BASOPHILS NFR BLD AUTO: 1.1 % — HIGH (ref 0–1)
BILIRUB SERPL-MCNC: 0.6 MG/DL — SIGNIFICANT CHANGE UP (ref 0.2–1.2)
BUN SERPL-MCNC: 14 MG/DL — SIGNIFICANT CHANGE UP (ref 10–20)
CALCIUM SERPL-MCNC: 9.8 MG/DL — SIGNIFICANT CHANGE UP (ref 8.4–10.5)
CHLORIDE SERPL-SCNC: 92 MMOL/L — LOW (ref 98–110)
CO2 SERPL-SCNC: 23 MMOL/L — SIGNIFICANT CHANGE UP (ref 17–32)
CREAT SERPL-MCNC: 1.3 MG/DL — SIGNIFICANT CHANGE UP (ref 0.7–1.5)
EGFR: 58 ML/MIN/1.73M2 — LOW
EOSINOPHIL # BLD AUTO: 0.15 K/UL — SIGNIFICANT CHANGE UP (ref 0–0.7)
EOSINOPHIL NFR BLD AUTO: 2.1 % — SIGNIFICANT CHANGE UP (ref 0–8)
GLUCOSE SERPL-MCNC: 79 MG/DL — SIGNIFICANT CHANGE UP (ref 70–99)
HCT VFR BLD CALC: 38.1 % — LOW (ref 42–52)
HGB BLD-MCNC: 13.3 G/DL — LOW (ref 14–18)
IMM GRANULOCYTES NFR BLD AUTO: 0.3 % — SIGNIFICANT CHANGE UP (ref 0.1–0.3)
INR BLD: 1.28 RATIO — SIGNIFICANT CHANGE UP (ref 0.65–1.3)
LYMPHOCYTES # BLD AUTO: 1.74 K/UL — SIGNIFICANT CHANGE UP (ref 1.2–3.4)
LYMPHOCYTES # BLD AUTO: 24.6 % — SIGNIFICANT CHANGE UP (ref 20.5–51.1)
MCHC RBC-ENTMCNC: 34.9 G/DL — SIGNIFICANT CHANGE UP (ref 32–37)
MCHC RBC-ENTMCNC: 34.9 PG — HIGH (ref 27–31)
MCV RBC AUTO: 100 FL — HIGH (ref 80–94)
MONOCYTES # BLD AUTO: 0.9 K/UL — HIGH (ref 0.1–0.6)
MONOCYTES NFR BLD AUTO: 12.7 % — HIGH (ref 1.7–9.3)
MRSA PCR RESULT.: NEGATIVE — SIGNIFICANT CHANGE UP
NEUTROPHILS # BLD AUTO: 4.18 K/UL — SIGNIFICANT CHANGE UP (ref 1.4–6.5)
NEUTROPHILS NFR BLD AUTO: 59.2 % — SIGNIFICANT CHANGE UP (ref 42.2–75.2)
NRBC # BLD: 0 /100 WBCS — SIGNIFICANT CHANGE UP (ref 0–0)
PLATELET # BLD AUTO: 209 K/UL — SIGNIFICANT CHANGE UP (ref 130–400)
PMV BLD: 10.2 FL — SIGNIFICANT CHANGE UP (ref 7.4–10.4)
POTASSIUM SERPL-MCNC: 4.4 MMOL/L — SIGNIFICANT CHANGE UP (ref 3.5–5)
POTASSIUM SERPL-SCNC: 4.4 MMOL/L — SIGNIFICANT CHANGE UP (ref 3.5–5)
PROT SERPL-MCNC: 7.7 G/DL — SIGNIFICANT CHANGE UP (ref 6–8)
PROTHROM AB SERPL-ACNC: 14.7 SEC — HIGH (ref 9.95–12.87)
RBC # BLD: 3.81 M/UL — LOW (ref 4.7–6.1)
RBC # FLD: 13.2 % — SIGNIFICANT CHANGE UP (ref 11.5–14.5)
SODIUM SERPL-SCNC: 131 MMOL/L — LOW (ref 135–146)
WBC # BLD: 7.07 K/UL — SIGNIFICANT CHANGE UP (ref 4.8–10.8)
WBC # FLD AUTO: 7.07 K/UL — SIGNIFICANT CHANGE UP (ref 4.8–10.8)

## 2023-06-06 PROCEDURE — 99214 OFFICE O/P EST MOD 30 MIN: CPT | Mod: 25

## 2023-06-06 PROCEDURE — 85730 THROMBOPLASTIN TIME PARTIAL: CPT

## 2023-06-06 PROCEDURE — 85025 COMPLETE CBC W/AUTO DIFF WBC: CPT

## 2023-06-06 PROCEDURE — 87641 MR-STAPH DNA AMP PROBE: CPT

## 2023-06-06 PROCEDURE — 87640 STAPH A DNA AMP PROBE: CPT

## 2023-06-06 PROCEDURE — 86900 BLOOD TYPING SEROLOGIC ABO: CPT

## 2023-06-06 PROCEDURE — 71046 X-RAY EXAM CHEST 2 VIEWS: CPT

## 2023-06-06 PROCEDURE — 85610 PROTHROMBIN TIME: CPT

## 2023-06-06 PROCEDURE — 93010 ELECTROCARDIOGRAM REPORT: CPT

## 2023-06-06 PROCEDURE — 80053 COMPREHEN METABOLIC PANEL: CPT

## 2023-06-06 PROCEDURE — 36415 COLL VENOUS BLD VENIPUNCTURE: CPT

## 2023-06-06 PROCEDURE — 71046 X-RAY EXAM CHEST 2 VIEWS: CPT | Mod: 26

## 2023-06-06 PROCEDURE — 93005 ELECTROCARDIOGRAM TRACING: CPT

## 2023-06-06 PROCEDURE — 86850 RBC ANTIBODY SCREEN: CPT

## 2023-06-06 PROCEDURE — 86901 BLOOD TYPING SEROLOGIC RH(D): CPT

## 2023-06-06 NOTE — H&P PST ADULT - NEUROLOGICAL COMMENTS
Taylor Hoffman   1943   [id number]       Reason for Visit/Chief Complaint:    Chief Complaint   Patient presents with   • Office Visit     Malignant lymphoma, large cell, diffuse (CMS/HCC)  (primary encounter diagnosis)  Generalized edema    Cancer Staging Summary for Lani Hoffman     Malignant lymphoma, large cell, diffuse (CMS/HCC)     Stage Date Classification Stage Status    5/23/19 Clinical Stage III (Diffuse large B-cell lymphoma) Signed by Marcellus Johnson MD on 12/14/20               Assessment & Plan     Problem   Malignant Lymphoma, Large Cell, Diffuse (Cms/Hcc)     Initially evaluated on May 22, 2019 at [facility]. Diagnosed with diffuse B-cell non-Hodgkin’s lymphoma with CD5 expression on May 24, 2019. She was admitted to hospital on May 17, 2019 after sustaining a fall without loss of consciousness.  At the time, she was noted to be markedly hypercalcemic with evidence of renal insufficiency. Responded to treatment with IV fluids and pamidronate.     She has a remote history of breast cancer diagnosed in the 1990s and treated with lumpectomy.  She underwent a lymph node biopsy and axillary lymph node dissection with 4 out of 24 lymph nodes followed by post-operative chemotherapy. Of note, she persisted on getting a chemotherapy regimen which would not cause hair loss  which suggests that she did not receive an anthracycline.     CT of C/A/P obtained on May 22, 2019 showed extensive left supraclavicular, left axillary mediastinal, and retroperitoneal lymphadenopathy findings concerning for lymphoma with metastatic disease not excluded. There is also mention of a compression fracture of T8, a small right sided pleural effusion, and lobulation of right kidney. A lymph node biopsy was performed on May 24 and a bone marrow biopsy on May 28.  Morphologic findings on lymph node core biopsy was consistent with diffuse large B-cell lymphoma. Per immunohistochemistry, tumor cells  expressed CD5 in addition to CD20 raising the possibility of a transformed mantle cell lymphoma. However, subsequent testing for 11;14  translocation was negative, inconsistent with mantle cell lymphoma. FISH studies were also negative for C-MYC  rearrangement indicating that this was not a double hit lymphoma. Bone marrow biopsy did not show involvement by lymphoma. LDH was elevated, she had B symptoms, ECOG 2 on presentation, stage III. IPI=4     Additional imaging studies performed included ultrasound of kidneys which suggested that the right lower pole asymmetry likely represented an exophytic cyst. MRI of brain and thoracic spine showed no abnormalities other than chronic compression fracture of the T8 vertebra and lymphadenopathy provided on previous CT. Bone study on May 22 did not show any lytic lesions. Bone scan performed May 20 was mostly unremarkable. Lumbar puncture performed May 30 also did not show evidence of lymphoma involvement.      She went on to initiate chemotherapy with the R-CHOP regimen on June 4, 2019. Initial concerns regarding cumulative anthracycline dosing were weighed against need to receive effective chemotherapy and the predominant evidence based on her clinical course describing that she likely didn’t receive an anthracycline. Echocardiogram performed on May 26, 2019 showed EF of 55-60% and grade 1 diastolic dysfunction. She received Neulasta on June 7, 2019.     Echocardiogram performed on 07/12 showed stable to improved EF of 60 to 65% and grade 1 diastolic dysfunction.      CT C/A/P on 07/30 demonstrated significant interval improvement of left supraclavicular, axillary and mediastinal adenopathy as well as retroperitoneal and pelvic adenopathy. No new lesions or lymph node enlargement.     Echocardiogram on 08/21 with stable EF of 60 to 65%.      Completed R-CHOP regimen on 09/17.      PET/CT on 10/08/19 revealed no scintigraphic finding concerning for FDG avid malignancy.  Uptake where there is a fracture deformity left 3rd rib and left 6th rib.     CT C/A/P on 5/7/20 demonstrated no evidence of disease no masses or lymphadenopathy, no evidence of disease     CT again performed 12/8/20 without evidence of disease     Malignant Lymphoma, Large Cell, Diffuse. She is currently 2.5 years out from her original diagnosis. No palpable lymphadenopathy on examination. Lab results from 11/8/21 unremarkable (see below). Will order a repeat CT C/A/P scan today in consideration of her generalized edema.     Generalized edema. Currently following with PT/OT for compression sleeves and stockings. Will order an echocardiogram to evaluate her cardiac function, as well as a repeat CT scan as outlined above.    I will see her in 2 weeks for follow up pending scan/echo results. Consider rescheduling if no abnormal findings    Subjective     She presents today for a follow up. Her brother is communicating via telephone today. Currently using compression sleeves for lymphedema. History of breast cancer with right lumpectomy. She has had swelling in her left arm since chemotherapy. Did have issues with LE lymphedema from varicose vein surgery. Notes that she has been gaining weight recently.     Current Outpatient Medications   Medication Sig   • ascorbic acid (VITAMIN C) 1000 MG tablet Take 1 tablet by mouth daily. Do not start before January 7, 2021.   • zinc sulfate (ZINCATE) 220 (50 Zn) MG capsule Take 1 capsule by mouth daily (with breakfast). Do not start before January 7, 2021.   • polyvinyl alcohol (Artificial Tears) 1.4 % ophthalmic solution Place 1 drop into both eyes 2 times daily.   • fluocinonide (LIDEX) 0.05 % cream Apply 1 application topically 2 times daily.   • losartan (COZAAR) 50 MG tablet Take 50 mg by mouth every morning.   • polyethylene glycol (MiraLax) 17 g packet Take 17 g by mouth daily. Stir and dissolve powder in any 4 to 8 ounces of beverage, then drink.   • docusate sodium  (COLACE) 100 MG capsule Take 100 mg by mouth 2 times daily.   • ergocalciferol (DRISDOL) 1.25 mg (50,000 units) capsule Take 1.25 mg by mouth 1 day a week. On Wednesday   • acetaminophen (TYLENOL) 325 MG tablet Take 650 mg by mouth every 6 hours as needed for Pain.   • diphenhydrAMINE (BENADRYL) 12.5 MG/5ML liquid Take 12.5 mg by mouth every morning. Swish and spit   • ibuprofen (MOTRIN) 200 MG tablet Take 200 mg by mouth 2 times daily as needed for Pain.   • magnesium hydroxide (MILK OF MAGNESIA) 400 MG/5ML suspension Take 15 mLs by mouth daily as needed for Constipation.   • ondansetron (ZOFRAN) 4 MG tablet Take 4 mg by mouth every 4 hours as needed for Nausea.   • prochlorperazine (COMPAZINE) 10 MG tablet Take 10 mg by mouth every 6 hours as needed for Nausea or Vomiting.   • senna (SENOKOT) 8.6 MG tablet Take 1 tablet by mouth at bedtime as needed for Constipation.   • allopurinol (ZYLOPRIM) 300 MG tablet Take 300 mg by mouth every morning.    • ammonium lactate (AMLACTIN) 12 % lotion Apply 1 application topically 2 times daily as needed for Dry Skin.    • aspirin (ECOTRIN) 81 MG EC tablet Take 81 mg by mouth daily (before breakfast).    • apixaBAN (ELIQUIS) 2.5 MG Tab Take 2.5 mg by mouth 2 times daily.    • esomeprazole (NEXIUM) 40 MG capsule Take 40 mg by mouth daily (before breakfast).   • fluticasone (FLONASE) 50 MCG/ACT nasal spray Spray 1 spray in each nostril daily.    • furosemide (LASIX) 40 MG tablet Take 40 mg by mouth daily.    • hydroCORTisone (ANUSOL-HC) 2.5 % rectal cream Apply 1 application topically daily. To hip   • lidocaine (LIDODERM) 5 % patch Place 1 patch onto the skin every 24 hours. Remove patch 12 hours after applying   • loratadine (CLARITIN) 10 MG tablet Take 10 mg by mouth daily.   • metFORMIN (GLUCOPHAGE) 500 MG tablet Take 500 mg by mouth 2 times daily (with meals).   • Multiple Vitamins-Minerals (MULTIVITAMIN ADULT PO) Take 1 tablet by mouth every morning.    • oxybutynin  (DITROPAN-XL) 5 MG 24 hr tablet Take 5 mg by mouth daily.   • tetrahydrozoline 0.05 % ophthalmic solution Place 1 drop into both eyes daily.    • Thiamine HCl (VITAMIN B-1) 100 MG tablet Take 100 mg by mouth daily.     No current facility-administered medications for this visit.       Review of Systems as above, otherwise unremarkable.    Objective     Last Recorded Vitals    Vitals:    11/22/21 1410   BP: 119/74   Pulse: 62   Resp: 16   Temp: 97.2 °F (36.2 °C)     ECOG   ECOG Performance Status        Physical Exam    Constitutional:       Appearance: Normal appearance. No acute distress  HENT:      Nose: No congestion or rhinorrhea.   Eyes:      General: No scleral icterus.     Extraocular Movements: Extraocular movements intact.   Neck:      Musculoskeletal: Neck supple. No RAVEN  Cardiovascular:      Rate and Rhythm: Regular rhythm. Normal rate  Pulmonary:      Effort: Pulmonary effort is normal. No respiratory distress.      Breath sounds: No wheezing.   Abdominal:      General: There is no distension.      Tenderness: There is no abdominal tenderness. There is no guarding.   No hepatosplenomegaly  Musculoskeletal:         General: Edema noted in both upper and lower extremities without pitting. She wears compression sleeves on both arms and compression stockings on both legs.   Lymphadenopathy:      Cervical: No cervical adenopathy.      Upper Body:      Right upper body: No supraclavicular or axillary adenopathy.      Left upper body: No supraclavicular or axillary adenopathy.      Lower Body: No right inguinal adenopathy. No left inguinal adenopathy.   Skin:     Coloration: Skin is not jaundiced.      Findings: No rash.   Neurological:      General: No focal deficit present.      Mental Status: Alert and oriented to person, place, and time.   Psychiatric:         Mood and Affect: Mood normal.         Behavior: Behavior normal.       Labs     11/8/21:  WBC=6.5  Hgb=11.8  Fdg=951  70.7% neutrophils  13.3%  lymphocytes  Cr=0.9  AST=19  ALT=14    GOT/AST (Units/L)   Date Value   01/06/2021 24     GPT/ALT (Units/L)   Date Value   01/06/2021 43     No results found for: GGTP  Alkaline Phosphatase (Units/L)   Date Value   01/06/2021 83     Bilirubin, Total (mg/dL)   Date Value   01/06/2021 0.6     Sodium (mmol/L)   Date Value   01/06/2021 137     Potassium (mmol/L)   Date Value   01/06/2021 4.1     Chloride (mmol/L)   Date Value   01/06/2021 101     Carbon Dioxide (mmol/L)   Date Value   01/06/2021 30     BUN (mg/dL)   Date Value   01/06/2021 36 (H)     Creatinine (mg/dL)   Date Value   01/06/2021 0.71     Glucose (mg/dL)   Date Value   01/06/2021 89       Imaging    Echocardiogram - 12/27/21  Component   Ventricular Rate EKG/Min (BPM)   98    Atrial Rate (BPM)   98    SD-Interval (MSEC)   138    QRS-Interval (MSEC)   82    QT-Interval (MSEC)   352    QTc   449    P Axis (Degrees)   -3    R Axis (Degrees)   -24    T Axis (Degrees)   37    REPORT TEXT   Normal sinus rhythm   with sinus arrhythmia   Normal ECG   When compared with ECG of   30-JUN-2020 12:55,   Vent. rate   has increased   BY  36 BPM   Confirmed by JF SUE MD ([id number]) on 12/27/2020 8:25:22 PM        Recent PHQ 2/9 Score    PHQ 2:  Date Adult PHQ 2 Score Adult PHQ 2 Interpretation   11/22/2021 0 No further screening needed       PHQ 9:          On 11/22/2021, Lucius MÁRQUEZ scribed the services personally performed by Dr. Johnson.    The documentation recorded by the scribe accurately and completely reflects the service(s) I personally performed and the decisions made by me.   Marcellus Johnson MD     weakness in lower extremity

## 2023-06-06 NOTE — H&P PST ADULT - REASON FOR ADMISSION
72 y/o male presents to PAST in preparation for lumbar2-3 laminectomy in [facility] under GA with Dr. Brito on 6/13/23

## 2023-06-06 NOTE — H&P PST ADULT - HISTORY OF PRESENT ILLNESS
74 y/o male presents to PAST in preparation for lumbar2-3 laminectomy in [facility] under GA with Dr. Brito on 6/13/23    Pt states that he had noticed that he has weakness in his lower extremities for the past 6-8 months. He has had 4 spinal injections with no relief. Pt now for above procedure due to symptoms.  Pt with a hx of afib followed by cardio Dr. Vargas.     PATIENT CURRENTLY DENIES CHEST PAIN  SHORTNESS OF BREATH  PALPITATIONS,  DYSURIA, OR UPPER RESPIRATORY INFECTION IN PAST 2 WEEKS  EXERCISE  TOLERANCE  4-5 FLIGHT OF STAIRS  WITHOUT SHORTNESS OF BREATH, but difficulty now due to leg weakness   pt denies any covid s/s,12/22  pt advised self quarantine till day of procedure  Patient verbalized understanding of instructions and was given the opportunity to ask questions and have them answered.  As per patient, this is their complete medical and surgical history, including medications both prescribed or over the counter.  written and verbal instructions with teach back on chlorhexidine shampoo provided,  pt verbalized understanding with returned demonstration    Anesthesia Alert  NO--Difficult Airway  Yes--History of neck surgery or radiation-C4-6 spinal fusion, radiation to left side of face  NO--Limited ROM of neck  NO--History of Malignant hyperthermia  NO--Personal or family history of Pseudocholinesterase deficiency.  NO--Prior Anesthesia Complication  NO--Latex Allergy  NO--Loose teeth  NO--History of Rheumatoid Arthritis  NO--RAJ  Yes--Bleeding risk, Xarelto   NO--Other_____  Mallampati airway: Class III    Opioid Risk Assessment Tool (Male)       Family history of substance abuse            Alcohol (3)            Illegal Drugs (3)            Prescription drugs (4)       Personal history of substance abuse            Alcohol (3)            Illegal Drugs (4)            Prescription drugs (5)       Age between 16-45 (1)       History of preadolescent sexual abuse (0)       Psychological disease (ADD, ADHD, OCD, Bipolar Disorder, Schizophrenia, Depression) (1)    Scoring Totals:  Low Risk (0-3)     Lumbar radiculopathy    Encounter for other preprocedural examination    [id number]; [id number]    [id number]

## 2023-06-06 NOTE — H&P PST ADULT - NSICDXPASTSURGICALHX_GEN_ALL_CORE_FT
PAST SURGICAL HISTORY:  H/O knee surgery     H/O laminectomy     H/O spinal fusion     Status post Mohs surgery

## 2023-06-06 NOTE — H&P PST ADULT - ATTENDING COMMENTS
Patient continues to have lower extremity weakness and heaviness when he walks  5/5 strength bilateral LE  Labs and pre op note reviewed   Proceed to OR  Risks and benefits discussed

## 2023-06-06 NOTE — H&P PST ADULT - NSICDXPASTMEDICALHX_GEN_ALL_CORE_FT
PAST MEDICAL HISTORY:  Afib     H/O low back pain     History of BPH     Hypertension     Hypothyroid     Skin cancer

## 2023-06-07 DIAGNOSIS — M54.16 RADICULOPATHY, LUMBAR REGION: ICD-10-CM

## 2023-06-07 DIAGNOSIS — Z01.818 ENCOUNTER FOR OTHER PREPROCEDURAL EXAMINATION: ICD-10-CM

## 2023-06-08 ENCOUNTER — OUTPATIENT (OUTPATIENT)
Dept: OUTPATIENT SERVICES | Facility: HOSPITAL | Age: 73
LOS: 1 days | End: 2023-06-08
Payer: MEDICARE

## 2023-06-08 DIAGNOSIS — Z98.890 OTHER SPECIFIED POSTPROCEDURAL STATES: Chronic | ICD-10-CM

## 2023-06-08 DIAGNOSIS — Z98.1 ARTHRODESIS STATUS: Chronic | ICD-10-CM

## 2023-06-08 DIAGNOSIS — Z02.9 ENCOUNTER FOR ADMINISTRATIVE EXAMINATIONS, UNSPECIFIED: ICD-10-CM

## 2023-06-08 PROBLEM — E03.9 HYPOTHYROIDISM, UNSPECIFIED: Chronic | Status: ACTIVE | Noted: 2023-06-06

## 2023-06-08 PROBLEM — I10 ESSENTIAL (PRIMARY) HYPERTENSION: Chronic | Status: ACTIVE | Noted: 2023-06-06

## 2023-06-08 PROBLEM — Z87.438 PERSONAL HISTORY OF OTHER DISEASES OF MALE GENITAL ORGANS: Chronic | Status: ACTIVE | Noted: 2023-06-06

## 2023-06-08 PROBLEM — C44.90 UNSPECIFIED MALIGNANT NEOPLASM OF SKIN, UNSPECIFIED: Chronic | Status: ACTIVE | Noted: 2023-06-06

## 2023-06-08 PROBLEM — I48.91 UNSPECIFIED ATRIAL FIBRILLATION: Chronic | Status: ACTIVE | Noted: 2023-06-06

## 2023-06-08 PROBLEM — Z87.39 PERSONAL HISTORY OF OTHER DISEASES OF THE MUSCULOSKELETAL SYSTEM AND CONNECTIVE TISSUE: Chronic | Status: ACTIVE | Noted: 2023-06-06

## 2023-06-08 LAB — SODIUM SERPL-SCNC: 141 MMOL/L — SIGNIFICANT CHANGE UP (ref 135–146)

## 2023-06-08 PROCEDURE — 36415 COLL VENOUS BLD VENIPUNCTURE: CPT

## 2023-06-08 PROCEDURE — 84295 ASSAY OF SERUM SODIUM: CPT

## 2023-06-09 DIAGNOSIS — Z02.9 ENCOUNTER FOR ADMINISTRATIVE EXAMINATIONS, UNSPECIFIED: ICD-10-CM

## 2023-06-12 NOTE — ASU PATIENT PROFILE, ADULT - FALL HARM RISK - UNIVERSAL INTERVENTIONS
Bed in lowest position, wheels locked, appropriate side rails in place/Call bell, personal items and telephone in reach/Instruct patient to call for assistance before getting out of bed or chair/Non-slip footwear when patient is out of bed/[place] to call system/Physically safe environment - no spills, clutter or unnecessary equipment/Purposeful Proactive Rounding/Room/bathroom lighting operational, light cord in reach

## 2023-06-12 NOTE — ASU PATIENT PROFILE, ADULT - PATIENT REPRESENTATIVE: ( YOU CAN CHOOSE ANY PERSON THAT CAN ASSIST YOU WITH YOUR HEALTH CARE PREFERENCES, DOES NOT HAVE TO BE A SPOUSE, IMMEDIATE FAMILY OR SIGNIFICANT OTHER/PARTNER)
Polysubstance abuse Thrombocytosis Thrombocytosis Polysubstance abuse Polysubstance abuse Polysubstance abuse Thrombocytosis Thrombocytosis Declines

## 2023-06-12 NOTE — ASU PATIENT PROFILE, ADULT - PRO INTERPRETER NEED 2
English Hydroxychloroquine Pregnancy And Lactation Text: This medication has been shown to cause fetal harm but it isn't assigned a Pregnancy Risk Category. There are small amounts excreted in breast milk.

## 2023-06-13 ENCOUNTER — TRANSCRIPTION ENCOUNTER (OUTPATIENT)
Age: 73
End: 2023-06-13

## 2023-06-13 ENCOUNTER — APPOINTMENT (OUTPATIENT)
Dept: ORTHOPEDIC SURGERY | Facility: HOSPITAL | Age: 73
End: 2023-06-13

## 2023-06-13 ENCOUNTER — INPATIENT (INPATIENT)
Facility: HOSPITAL | Age: 73
LOS: 1 days | Discharge: HOME | End: 2023-06-15

## 2023-06-13 VITALS
DIASTOLIC BLOOD PRESSURE: 95 MMHG | SYSTOLIC BLOOD PRESSURE: 123 MMHG | OXYGEN SATURATION: 99 % | RESPIRATION RATE: 16 BRPM | TEMPERATURE: 98 F | HEART RATE: 86 BPM

## 2023-06-13 DIAGNOSIS — M54.16 RADICULOPATHY, LUMBAR REGION: ICD-10-CM

## 2023-06-13 DIAGNOSIS — Z98.1 ARTHRODESIS STATUS: ICD-10-CM

## 2023-06-13 DIAGNOSIS — Z98.1 ARTHRODESIS STATUS: Chronic | ICD-10-CM

## 2023-06-13 DIAGNOSIS — Z85.828 PERSONAL HISTORY OF OTHER MALIGNANT NEOPLASM OF SKIN: ICD-10-CM

## 2023-06-13 DIAGNOSIS — Z98.890 OTHER SPECIFIED POSTPROCEDURAL STATES: Chronic | ICD-10-CM

## 2023-06-13 DIAGNOSIS — M48.062 SPINAL STENOSIS, LUMBAR REGION WITH NEUROGENIC CLAUDICATION: ICD-10-CM
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
## 2023-06-13 DIAGNOSIS — G97.41 ACCIDENTAL PUNCTURE OR LACERATION OF DURA DURING A PROCEDURE: ICD-10-CM

## 2023-06-13 DIAGNOSIS — L90.5 SCAR CONDITIONS AND FIBROSIS OF SKIN: ICD-10-CM

## 2023-06-13 LAB
BASOPHILS # BLD AUTO: 0 K/UL — SIGNIFICANT CHANGE UP (ref 0–0.2)
BASOPHILS NFR BLD AUTO: 0 % — SIGNIFICANT CHANGE UP (ref 0–1)
EOSINOPHIL # BLD AUTO: 0 K/UL — SIGNIFICANT CHANGE UP (ref 0–0.7)
EOSINOPHIL NFR BLD AUTO: 0 % — SIGNIFICANT CHANGE UP (ref 0–8)
HCT VFR BLD CALC: 32.3 % — LOW (ref 42–52)
HGB BLD-MCNC: 11.3 G/DL — LOW (ref 14–18)
LYMPHOCYTES # BLD AUTO: 0.16 K/UL — LOW (ref 1.2–3.4)
LYMPHOCYTES # BLD AUTO: 2.6 % — LOW (ref 20.5–51.1)
MANUAL SMEAR VERIFICATION: SIGNIFICANT CHANGE UP
MCHC RBC-ENTMCNC: 35 G/DL — SIGNIFICANT CHANGE UP (ref 32–37)
MCHC RBC-ENTMCNC: 35.1 PG — HIGH (ref 27–31)
MCV RBC AUTO: 100.3 FL — HIGH (ref 80–94)
MONOCYTES # BLD AUTO: 0 K/UL — LOW (ref 0.1–0.6)
MONOCYTES NFR BLD AUTO: 0 % — LOW (ref 1.7–9.3)
NEUTROPHILS # BLD AUTO: 6.04 K/UL — SIGNIFICANT CHANGE UP (ref 1.4–6.5)
NEUTROPHILS NFR BLD AUTO: 96.5 % — HIGH (ref 42.2–75.2)
PLAT MORPH BLD: NORMAL — SIGNIFICANT CHANGE UP
PLATELET # BLD AUTO: 156 K/UL — SIGNIFICANT CHANGE UP (ref 130–400)
PMV BLD: 10 FL — SIGNIFICANT CHANGE UP (ref 7.4–10.4)
POIKILOCYTOSIS BLD QL AUTO: SLIGHT — SIGNIFICANT CHANGE UP
POLYCHROMASIA BLD QL SMEAR: SLIGHT — SIGNIFICANT CHANGE UP
RBC # BLD: 3.22 M/UL — LOW (ref 4.7–6.1)
RBC # FLD: 12.9 % — SIGNIFICANT CHANGE UP (ref 11.5–14.5)
RBC BLD AUTO: ABNORMAL
VARIANT LYMPHS # BLD: 0.9 % — SIGNIFICANT CHANGE UP (ref 0–5)
WBC # BLD: 6.26 K/UL — SIGNIFICANT CHANGE UP (ref 4.8–10.8)
WBC # FLD AUTO: 6.26 K/UL — SIGNIFICANT CHANGE UP (ref 4.8–10.8)

## 2023-06-13 RX ORDER — SENNA PLUS 8.6 MG/1
2 TABLET ORAL AT BEDTIME
Refills: 0 | Status: DISCONTINUED | OUTPATIENT
Start: 2023-06-13 | End: 2023-06-15

## 2023-06-13 RX ORDER — SODIUM CHLORIDE 9 MG/ML
1000 INJECTION INTRAMUSCULAR; INTRAVENOUS; SUBCUTANEOUS
Refills: 0 | Status: COMPLETED | OUTPATIENT
Start: 2023-06-13 | End: 2023-06-14

## 2023-06-13 RX ORDER — APREPITANT 80 MG/1
40 CAPSULE ORAL ONCE
Refills: 0 | Status: DISCONTINUED | OUTPATIENT
Start: 2023-06-13 | End: 2023-06-13

## 2023-06-13 RX ORDER — TAMSULOSIN HYDROCHLORIDE 0.4 MG/1
1 CAPSULE ORAL
Refills: 0 | DISCHARGE

## 2023-06-13 RX ORDER — OLMESARTAN MEDOXOMIL 5 MG/1
1 TABLET, FILM COATED ORAL
Refills: 0 | DISCHARGE

## 2023-06-13 RX ORDER — HYDROMORPHONE HYDROCHLORIDE 2 MG/ML
0.25 INJECTION INTRAMUSCULAR; INTRAVENOUS; SUBCUTANEOUS
Refills: 0 | Status: DISCONTINUED | OUTPATIENT
Start: 2023-06-13 | End: 2023-06-15

## 2023-06-13 RX ORDER — MORPHINE SULFATE 50 MG/1
2 CAPSULE, EXTENDED RELEASE ORAL
Refills: 0 | Status: DISCONTINUED | OUTPATIENT
Start: 2023-06-13 | End: 2023-06-15

## 2023-06-13 RX ORDER — LOSARTAN POTASSIUM 100 MG/1
50 TABLET, FILM COATED ORAL DAILY
Refills: 0 | Status: DISCONTINUED | OUTPATIENT
Start: 2023-06-13 | End: 2023-06-15

## 2023-06-13 RX ORDER — ACETAMINOPHEN 500 MG
1000 TABLET ORAL ONCE
Refills: 0 | Status: COMPLETED | OUTPATIENT
Start: 2023-06-13 | End: 2023-06-13

## 2023-06-13 RX ORDER — HYDROMORPHONE HYDROCHLORIDE 2 MG/ML
0.5 INJECTION INTRAMUSCULAR; INTRAVENOUS; SUBCUTANEOUS
Refills: 0 | Status: DISCONTINUED | OUTPATIENT
Start: 2023-06-13 | End: 2023-06-15

## 2023-06-13 RX ORDER — RIVAROXABAN 15 MG-20MG
1 KIT ORAL
Refills: 0 | DISCHARGE

## 2023-06-13 RX ORDER — TAMSULOSIN HYDROCHLORIDE 0.4 MG/1
0.4 CAPSULE ORAL AT BEDTIME
Refills: 0 | Status: DISCONTINUED | OUTPATIENT
Start: 2023-06-13 | End: 2023-06-15

## 2023-06-13 RX ORDER — METOPROLOL TARTRATE 50 MG
1 TABLET ORAL
Refills: 0 | DISCHARGE

## 2023-06-13 RX ORDER — KETOROLAC TROMETHAMINE 30 MG/ML
15 SYRINGE (ML) INJECTION EVERY 8 HOURS
Refills: 0 | Status: DISCONTINUED | OUTPATIENT
Start: 2023-06-13 | End: 2023-06-15

## 2023-06-13 RX ORDER — ACETAMINOPHEN 500 MG
1000 TABLET ORAL ONCE
Refills: 0 | Status: DISCONTINUED | OUTPATIENT
Start: 2023-06-13 | End: 2023-06-13

## 2023-06-13 RX ORDER — MEPERIDINE HYDROCHLORIDE 50 MG/ML
12.5 INJECTION INTRAMUSCULAR; INTRAVENOUS; SUBCUTANEOUS ONCE
Refills: 0 | Status: DISCONTINUED | OUTPATIENT
Start: 2023-06-13 | End: 2023-06-15

## 2023-06-13 RX ORDER — LEVOTHYROXINE SODIUM 125 MCG
125 TABLET ORAL DAILY
Refills: 0 | Status: DISCONTINUED | OUTPATIENT
Start: 2023-06-13 | End: 2023-06-15

## 2023-06-13 RX ORDER — APREPITANT 80 MG/1
40 CAPSULE ORAL ONCE
Refills: 0 | Status: COMPLETED | OUTPATIENT
Start: 2023-06-13 | End: 2023-06-13

## 2023-06-13 RX ORDER — SODIUM CHLORIDE 9 MG/ML
1000 INJECTION, SOLUTION INTRAVENOUS
Refills: 0 | Status: DISCONTINUED | OUTPATIENT
Start: 2023-06-13 | End: 2023-06-14

## 2023-06-13 RX ORDER — RIVAROXABAN 15 MG-20MG
20 KIT ORAL DAILY
Refills: 0 | Status: DISCONTINUED | OUTPATIENT
Start: 2023-06-13 | End: 2023-06-13

## 2023-06-13 RX ORDER — RIVAROXABAN 15 MG-20MG
20 KIT ORAL DAILY
Refills: 0 | Status: DISCONTINUED | OUTPATIENT
Start: 2023-06-14 | End: 2023-06-15

## 2023-06-13 RX ORDER — OXYCODONE HYDROCHLORIDE 5 MG/1
5 TABLET ORAL EVERY 6 HOURS
Refills: 0 | Status: DISCONTINUED | OUTPATIENT
Start: 2023-06-13 | End: 2023-06-15

## 2023-06-13 RX ORDER — METOPROLOL TARTRATE 50 MG
25 TABLET ORAL DAILY
Refills: 0 | Status: DISCONTINUED | OUTPATIENT
Start: 2023-06-13 | End: 2023-06-15

## 2023-06-13 RX ORDER — ONDANSETRON 8 MG/1
4 TABLET, FILM COATED ORAL ONCE
Refills: 0 | Status: DISCONTINUED | OUTPATIENT
Start: 2023-06-13 | End: 2023-06-15

## 2023-06-13 RX ORDER — DIGOXIN 250 MCG
125 TABLET ORAL DAILY
Refills: 0 | Status: DISCONTINUED | OUTPATIENT
Start: 2023-06-13 | End: 2023-06-15

## 2023-06-13 RX ORDER — CEFAZOLIN SODIUM 1 G
2000 VIAL (EA) INJECTION EVERY 8 HOURS
Refills: 0 | Status: COMPLETED | OUTPATIENT
Start: 2023-06-13 | End: 2023-06-14

## 2023-06-13 RX ORDER — HYDROMORPHONE HYDROCHLORIDE 2 MG/ML
0.5 INJECTION INTRAMUSCULAR; INTRAVENOUS; SUBCUTANEOUS EVERY 4 HOURS
Refills: 0 | Status: DISCONTINUED | OUTPATIENT
Start: 2023-06-13 | End: 2023-06-15

## 2023-06-13 RX ORDER — ONDANSETRON 8 MG/1
4 TABLET, FILM COATED ORAL EVERY 6 HOURS
Refills: 0 | Status: DISCONTINUED | OUTPATIENT
Start: 2023-06-13 | End: 2023-06-15

## 2023-06-13 RX ORDER — DIGOXIN 250 MCG
1 TABLET ORAL
Refills: 0 | DISCHARGE

## 2023-06-13 RX ORDER — LEVOTHYROXINE SODIUM 125 MCG
1 TABLET ORAL
Refills: 0 | DISCHARGE

## 2023-06-13 RX ADMIN — Medication 15 MILLIGRAM(S): at 21:19

## 2023-06-13 RX ADMIN — APREPITANT 40 MILLIGRAM(S): 80 CAPSULE ORAL at 12:13

## 2023-06-13 RX ADMIN — Medication 100 MILLIGRAM(S): at 21:19

## 2023-06-13 RX ADMIN — SENNA PLUS 2 TABLET(S): 8.6 TABLET ORAL at 21:19

## 2023-06-13 RX ADMIN — SODIUM CHLORIDE 75 MILLILITER(S): 9 INJECTION INTRAMUSCULAR; INTRAVENOUS; SUBCUTANEOUS at 19:20

## 2023-06-13 RX ADMIN — Medication 1000 MILLIGRAM(S): at 12:11

## 2023-06-13 RX ADMIN — TAMSULOSIN HYDROCHLORIDE 0.4 MILLIGRAM(S): 0.4 CAPSULE ORAL at 21:19

## 2023-06-13 RX ADMIN — Medication 1000 MILLIGRAM(S): at 12:13

## 2023-06-13 NOTE — PATIENT PROFILE ADULT - FALL HARM RISK - HARM RISK INTERVENTIONS
Assistance with ambulation/Assistance OOB with selected safe patient handling equipment/Communicate Risk of Fall with Harm to all staff/Discuss with provider need for PT consult/Monitor gait and stability/Provide patient with walking aids - walker, cane, crutches/Reinforce activity limits and safety measures with patient and family/Sit up slowly, dangle for a short time, stand at bedside before walking/Tailored Fall Risk Interventions/Use of alarms - bed, chair and/or voice tab/Visual Cue: Yellow wristband and red socks/Bed in lowest position, wheels locked, appropriate side rails in place/Call bell, personal items and telephone in reach/Instruct patient to call for assistance before getting out of bed or chair/Non-slip footwear when patient is out of bed/[place] to call system/Physically safe environment - no spills, clutter or unnecessary equipment/Purposeful Proactive Rounding/Room/bathroom lighting operational, light cord in reach

## 2023-06-13 NOTE — CHART NOTE - NSCHARTNOTEFT_GEN_A_CORE
PACU ANESTHESIA ADMISSION NOTE      Procedure: L2-3 Laminectomy, Durotomy Repair  Post op diagnosis:      ____  Intubated  TV:______       Rate: ______      FiO2: ______    __x__  Patent Airway    __x__  Full return of protective reflexes    __x__  Full recovery from anesthesia / back to baseline     Vitals:   T: 98.4          R:  12                BP: 97/54                 Sat: 99                  P: 93      Mental Status:  ____ Awake   _____ Alert   ____x_ Drowsy   _____ Sedated    Nausea/Vomiting:  _x___ NO  ______Yes,   See Post - Op Orders          Pain Scale (0-10):  __0___    Treatment: ____ None    ____ See Post - Op/PCA Orders    Post - Operative Fluids:   ____ Oral   ____ See Post - Op Orders    Plan: Discharge:   ____Home       ___x__Floor     _____Critical Care    _____  Other:_________________    Comments:

## 2023-06-13 NOTE — BRIEF OPERATIVE NOTE - NSICDXBRIEFPREOP_GEN_ALL_CORE_FT
PRE-OP DIAGNOSIS:  Spinal stenosis of lumbar region with radiculopathy 13-Jun-2023 18:49:48  Detweiler, Maxwell Chase

## 2023-06-13 NOTE — BRIEF OPERATIVE NOTE - NSICDXBRIEFPROCEDURE_GEN_ALL_CORE_FT
PROCEDURES:  Lumbar laminectomy 13-Jun-2023 18:50:04  Detweiler, Maxwell Chase  
PROCEDURES:  Lumbar laminectomy 13-Jun-2023 18:50:04  Detweiler, Maxwell Chase

## 2023-06-13 NOTE — PROGRESS NOTE ADULT - SUBJECTIVE AND OBJECTIVE BOX
Orthopaedic Surgery Postoperative Check Note    Procedure: L2-L3 Laminectomy   EBL: 150mL    Pt S&E after above procedure. Pt resting comfortably. Pain well controlled. Denies f/c/cp/sob/n/v/d    Vitals:  T(C): 35.6 (06-13-23 @ 21:00), Max: 36.9 (06-13-23 @ 18:37)  HR: 83 (06-13-23 @ 21:00) (82 - 91)  BP: 113/71 (06-13-23 @ 21:00) (97/54 - 131/67)  RR: 18 (06-13-23 @ 21:00) (12 - 18)  SpO2: 96% (06-13-23 @ 21:00) (95% - 99%)    P/E:  NAD, Awake, alert  Nonlabored breathing      B/L LE  Compartments soft/compressible, no pain w/ passive stretch  SILT sp/dp/t/sural/saph  Firing ta/ehl/fhl/gs  DP pulse 2+, foot wwp    Labs:                        11.3   6.26  )-----------( 156      ( 13 Jun 2023 19:17 )             32.3       A/P:   Pt in stable condition after above procedure    Weight bearing: Bedrest in supine position  AM labs  DVT ppx: Xarelto to begin tomorrow 6/13  Postop abx for 24 hrs  Diet  Pain control  Home medications  PT/OT/rehab  Please page Ortho w/ any questions/concerns

## 2023-06-14 LAB
ANION GAP SERPL CALC-SCNC: 14 MMOL/L — SIGNIFICANT CHANGE UP (ref 7–14)
BASOPHILS # BLD AUTO: 0.01 K/UL — SIGNIFICANT CHANGE UP (ref 0–0.2)
BASOPHILS NFR BLD AUTO: 0.1 % — SIGNIFICANT CHANGE UP (ref 0–1)
BUN SERPL-MCNC: 19 MG/DL — SIGNIFICANT CHANGE UP (ref 10–20)
CALCIUM SERPL-MCNC: 8.4 MG/DL — SIGNIFICANT CHANGE UP (ref 8.4–10.5)
CHLORIDE SERPL-SCNC: 95 MMOL/L — LOW (ref 98–110)
CO2 SERPL-SCNC: 20 MMOL/L — SIGNIFICANT CHANGE UP (ref 17–32)
CREAT SERPL-MCNC: 1.3 MG/DL — SIGNIFICANT CHANGE UP (ref 0.7–1.5)
EGFR: 58 ML/MIN/1.73M2 — LOW
EOSINOPHIL # BLD AUTO: 0 K/UL — SIGNIFICANT CHANGE UP (ref 0–0.7)
EOSINOPHIL NFR BLD AUTO: 0 % — SIGNIFICANT CHANGE UP (ref 0–8)
GLUCOSE SERPL-MCNC: 148 MG/DL — HIGH (ref 70–99)
HCT VFR BLD CALC: 33.6 % — LOW (ref 42–52)
HGB BLD-MCNC: 12 G/DL — LOW (ref 14–18)
IMM GRANULOCYTES NFR BLD AUTO: 0.5 % — HIGH (ref 0.1–0.3)
LYMPHOCYTES # BLD AUTO: 0.52 K/UL — LOW (ref 1.2–3.4)
LYMPHOCYTES # BLD AUTO: 4.5 % — LOW (ref 20.5–51.1)
MCHC RBC-ENTMCNC: 35.7 G/DL — SIGNIFICANT CHANGE UP (ref 32–37)
MCHC RBC-ENTMCNC: 35.9 PG — HIGH (ref 27–31)
MCV RBC AUTO: 100.6 FL — HIGH (ref 80–94)
MONOCYTES # BLD AUTO: 0.3 K/UL — SIGNIFICANT CHANGE UP (ref 0.1–0.6)
MONOCYTES NFR BLD AUTO: 2.6 % — SIGNIFICANT CHANGE UP (ref 1.7–9.3)
NEUTROPHILS # BLD AUTO: 10.55 K/UL — HIGH (ref 1.4–6.5)
NEUTROPHILS NFR BLD AUTO: 92.3 % — HIGH (ref 42.2–75.2)
NRBC # BLD: 0 /100 WBCS — SIGNIFICANT CHANGE UP (ref 0–0)
PLATELET # BLD AUTO: 159 K/UL — SIGNIFICANT CHANGE UP (ref 130–400)
PMV BLD: 10.6 FL — HIGH (ref 7.4–10.4)
POTASSIUM SERPL-MCNC: 5.2 MMOL/L — HIGH (ref 3.5–5)
POTASSIUM SERPL-SCNC: 5.2 MMOL/L — HIGH (ref 3.5–5)
RBC # BLD: 3.34 M/UL — LOW (ref 4.7–6.1)
RBC # FLD: 12.8 % — SIGNIFICANT CHANGE UP (ref 11.5–14.5)
SODIUM SERPL-SCNC: 129 MMOL/L — LOW (ref 135–146)
WBC # BLD: 11.44 K/UL — HIGH (ref 4.8–10.8)
WBC # FLD AUTO: 11.44 K/UL — HIGH (ref 4.8–10.8)

## 2023-06-14 RX ADMIN — SODIUM CHLORIDE 75 MILLILITER(S): 9 INJECTION INTRAMUSCULAR; INTRAVENOUS; SUBCUTANEOUS at 10:46

## 2023-06-14 RX ADMIN — Medication 100 MILLIGRAM(S): at 13:27

## 2023-06-14 RX ADMIN — LOSARTAN POTASSIUM 50 MILLIGRAM(S): 100 TABLET, FILM COATED ORAL at 05:59

## 2023-06-14 RX ADMIN — HYDROMORPHONE HYDROCHLORIDE 0.5 MILLIGRAM(S): 2 INJECTION INTRAMUSCULAR; INTRAVENOUS; SUBCUTANEOUS at 01:52

## 2023-06-14 RX ADMIN — TAMSULOSIN HYDROCHLORIDE 0.4 MILLIGRAM(S): 0.4 CAPSULE ORAL at 21:54

## 2023-06-14 RX ADMIN — Medication 125 MICROGRAM(S): at 05:59

## 2023-06-14 RX ADMIN — Medication 25 MILLIGRAM(S): at 05:59

## 2023-06-14 RX ADMIN — SENNA PLUS 2 TABLET(S): 8.6 TABLET ORAL at 21:54

## 2023-06-14 RX ADMIN — Medication 15 MILLIGRAM(S): at 05:58

## 2023-06-14 RX ADMIN — Medication 100 MILLIGRAM(S): at 05:58

## 2023-06-14 RX ADMIN — Medication 15 MILLIGRAM(S): at 13:27

## 2023-06-14 NOTE — PROGRESS NOTE ADULT - SUBJECTIVE AND OBJECTIVE BOX
SUBJECTIVE: Patient seen and examined. Pain controlled.  Pt did well o/n  No f/c/n/v/cp/sob.     OBJECTIVE:  NAD  Vital Signs Last 24 Hrs  T(C): 35.9 (14 Jun 2023 05:19), Max: 36.9 (13 Jun 2023 18:37)  T(F): 96.7 (14 Jun 2023 05:19), Max: 98.4 (13 Jun 2023 18:37)  HR: 91 (14 Jun 2023 05:19) (82 - 100)  BP: 137/72 (14 Jun 2023 05:19) (97/54 - 137/72)  BP(mean): --  RR: 18 (14 Jun 2023 05:19) (12 - 18)  SpO2: 99% (14 Jun 2023 05:19) (95% - 99%)    Parameters below as of 13 Jun 2023 19:50  Patient On (Oxygen Delivery Method): room air        Affected extremity:   B/L LE  Compartments soft/compressible, no pain w/ passive stretch  SILT sp/dp/t/sural/saph  Firing ta/ehl/fhl/gs  DP pulse 2+, foot wwp                          11.3   6.26  )-----------( 156      ( 13 Jun 2023 19:17 )             32.3           A/P :  Pt is a 74yo Male s/p L2-L3 Laminectomy and repair of dural tear on 6/13, doing well.        Weight bearing: Bedrest in supine position -- will begin trial of increased head elevation today  AM labs  DVT ppx: Xarelto to begin today 6/14  Postop abx for 24 hrs  Diet  Pain control  Home medications  PT/OT/rehab  Please page Ortho w/ any questions/concerns

## 2023-06-15 ENCOUNTER — TRANSCRIPTION ENCOUNTER (OUTPATIENT)
Age: 73
End: 2023-06-15

## 2023-06-15 VITALS
RESPIRATION RATE: 18 BRPM | HEART RATE: 92 BPM | DIASTOLIC BLOOD PRESSURE: 60 MMHG | OXYGEN SATURATION: 97 % | TEMPERATURE: 99 F | SYSTOLIC BLOOD PRESSURE: 103 MMHG

## 2023-06-15 RX ORDER — MELOXICAM 15 MG/1
1 TABLET ORAL
Qty: 90 | Refills: 0
Start: 2023-06-15 | End: 2023-07-14

## 2023-06-15 RX ORDER — CELECOXIB 200 MG/1
200 CAPSULE ORAL EVERY 12 HOURS
Refills: 0 | Status: DISCONTINUED | OUTPATIENT
Start: 2023-06-15 | End: 2023-06-15

## 2023-06-15 RX ORDER — OXYCODONE HYDROCHLORIDE 5 MG/1
1 TABLET ORAL
Qty: 40 | Refills: 0
Start: 2023-06-15 | End: 2023-06-24

## 2023-06-15 RX ORDER — SENNA PLUS 8.6 MG/1
2 TABLET ORAL
Qty: 0 | Refills: 0 | DISCHARGE
Start: 2023-06-15

## 2023-06-15 RX ORDER — CYCLOBENZAPRINE HYDROCHLORIDE 10 MG/1
10 TABLET, FILM COATED ORAL THREE TIMES A DAY
Refills: 0 | Status: DISCONTINUED | OUTPATIENT
Start: 2023-06-15 | End: 2023-06-15

## 2023-06-15 RX ORDER — CYCLOBENZAPRINE HYDROCHLORIDE 10 MG/1
1 TABLET, FILM COATED ORAL
Qty: 90 | Refills: 0
Start: 2023-06-15 | End: 2023-07-14

## 2023-06-15 RX ADMIN — Medication 125 MICROGRAM(S): at 05:21

## 2023-06-15 RX ADMIN — Medication 25 MILLIGRAM(S): at 05:21

## 2023-06-15 RX ADMIN — OXYCODONE HYDROCHLORIDE 5 MILLIGRAM(S): 5 TABLET ORAL at 07:40

## 2023-06-15 RX ADMIN — Medication 15 MILLIGRAM(S): at 13:30

## 2023-06-15 RX ADMIN — LOSARTAN POTASSIUM 50 MILLIGRAM(S): 100 TABLET, FILM COATED ORAL at 05:21

## 2023-06-15 RX ADMIN — Medication 125 MICROGRAM(S): at 05:20

## 2023-06-15 RX ADMIN — CELECOXIB 200 MILLIGRAM(S): 200 CAPSULE ORAL at 17:20

## 2023-06-15 RX ADMIN — Medication 15 MILLIGRAM(S): at 16:33

## 2023-06-15 RX ADMIN — RIVAROXABAN 20 MILLIGRAM(S): KIT at 11:11

## 2023-06-15 RX ADMIN — OXYCODONE HYDROCHLORIDE 5 MILLIGRAM(S): 5 TABLET ORAL at 09:42

## 2023-06-15 NOTE — DISCHARGE NOTE NURSING/CASE MANAGEMENT/SOCIAL WORK - PATIENT PORTAL LINK FT
You can access the FollowMyHealth Patient Portal offered by [facility] by registering at the following website: [email or web address]. By joining [facility]’s FollowMyHealth portal, you will also be able to view your health information using other applications (apps) compatible with our system.

## 2023-06-15 NOTE — DISCHARGE NOTE PROVIDER - HOSPITAL COURSE
·  PROCEDURES:  Lumbar laminectomy 13-Jun-2023 18:50:04   Lumbar stenosis, durotomy, severe scarring

## 2023-06-15 NOTE — PHYSICAL THERAPY INITIAL EVALUATION ADULT - NSACTIVITYREC_GEN_A_PT
Pt educated on seated ther ex, including ankle pumps, knee ext, hip flex. Pt educated on continued ambulation with nursing staff throughout the day.

## 2023-06-15 NOTE — DISCHARGE NOTE PROVIDER - CARE PROVIDER_API CALL
Matthew Brito  Orthopaedic Surgery  [address] [place] [address]  Phone: [phone]  Fax: [phone]  Established Patient  Follow Up Time: 1 week

## 2023-06-15 NOTE — PROGRESS NOTE ADULT - ATTENDING COMMENTS
Patient seen bedside. Durotomy plan discussed with him again. Patient denies headaches. States pre op tingling in his feet is partially improved  Dressing is clean dry and intact  5/5 strength bilateral HF, KE, ankle DF, PF, EHL  2/2 sens bilateral LE  POD1 sp L2-3 lami repair of durotomy  Flat bed rest. This afternoon will attempt slow trial of HOB elevation  ancef  restart xarelto  pain control  abdominal binder
POD2 s/p L2-3 laminectomy with durotomy repair  No headaches with patient sitting fully up  Patient states preoperative paresthesias in his feet are improving  Dressing dry  5/5 strength bilateral HF, KE, ankle DF, PF, EHL  DC home today if patient mobilizes and walks without headaches  continue xarelto  f/u as outpt. Appt already scheduled

## 2023-06-15 NOTE — PROGRESS NOTE ADULT - SUBJECTIVE AND OBJECTIVE BOX
ORTHOPAEDIC SURGERY PROGRESS NOTE    Interval History:  Patient seen and examined at bedside.  Pain is controlled.  No complaints of chest pain, SOB, N/V.    MEDICATIONS  (STANDING):  digoxin     Tablet 125 MICROGram(s) Oral daily  HYDROmorphone  Injectable 0.5 milliGRAM(s) IV Push every 4 hours  ketorolac   Injectable 15 milliGRAM(s) IV Push every 8 hours  levothyroxine 125 MICROGram(s) Oral daily  losartan 50 milliGRAM(s) Oral daily  metoprolol succinate ER 25 milliGRAM(s) Oral daily  rivaroxaban 20 milliGRAM(s) Oral daily  senna 2 Tablet(s) Oral at bedtime  tamsulosin 0.4 milliGRAM(s) Oral at bedtime    MEDICATIONS  (PRN):  HYDROmorphone  Injectable 0.25 milliGRAM(s) IV Push every 10 minutes PRN Moderate Pain (4 - 6)  HYDROmorphone  Injectable 0.5 milliGRAM(s) IV Push every 10 minutes PRN Severe Pain (7 -10)  meperidine     Injectable 12.5 milliGRAM(s) IV Push once PRN Shivering  morphine  - Injectable 2 milliGRAM(s) IV Push every 3 hours PRN Severe Pain (7 - 10)  ondansetron Injectable 4 milliGRAM(s) IV Push every 6 hours PRN Nausea and/or Vomiting  ondansetron Injectable 4 milliGRAM(s) IV Push once PRN Nausea and/or Vomiting  oxyCODONE    IR 5 milliGRAM(s) Oral every 6 hours PRN Moderate Pain (4 - 6)      Vital Signs Last 24 Hrs  T(C): 37.1 (15 Henrik 2023 04:59), Max: 37.1 (15 Henrik 2023 04:59)  T(F): 98.7 (15 Henrik 2023 04:59), Max: 98.7 (15 Henrik 2023 04:59)  HR: 83 (15 Henrik 2023 04:59) (71 - 87)  BP: 146/72 (15 Henrik 2023 04:59) (128/62 - 146/72)  BP(mean): --  RR: 18 (15 Henrik 2023 04:59) (18 - 18)  SpO2: 98% (15 Henrik 2023 04:59) (97% - 100%)    Physical Exam:   Dressing C/D/I  Compartments soft and compressible  Motor intact distally  SILT distally  CR<2sec, palpable pulses                           12.0   11.44 )-----------( 159      ( 14 Jun 2023 06:28 )             33.6     06-14    129<L>  |  95<L>  |  19  ----------------------------<  148<H>  5.2<H>   |  20  |  1.3    Ca    8.4      14 Jun 2023 06:28            06-14-23 @ 07:01  -  06-15-23 @ 06:31  --------------------------------------------------------  IN: 225 mL / OUT: 250 mL / NET: -25 mL        A/P: 73yMale ***    - OOB to Chair   - ***WBAT/NWB  - PT/OT  - Pain control   - Extremity icing/elevation  - Incentive Spirometry   - DVT Prophylaxis   - Discharge planning ORTHOPAEDIC SURGERY PROGRESS NOTE    Interval History:  Patient seen and examined at bedside. Head of bed elevated   Pain is controlled. Denies headache.    MEDICATIONS  (STANDING):  digoxin     Tablet 125 MICROGram(s) Oral daily  HYDROmorphone  Injectable 0.5 milliGRAM(s) IV Push every 4 hours  ketorolac   Injectable 15 milliGRAM(s) IV Push every 8 hours  levothyroxine 125 MICROGram(s) Oral daily  losartan 50 milliGRAM(s) Oral daily  metoprolol succinate ER 25 milliGRAM(s) Oral daily  rivaroxaban 20 milliGRAM(s) Oral daily  senna 2 Tablet(s) Oral at bedtime  tamsulosin 0.4 milliGRAM(s) Oral at bedtime    MEDICATIONS  (PRN):  HYDROmorphone  Injectable 0.25 milliGRAM(s) IV Push every 10 minutes PRN Moderate Pain (4 - 6)  HYDROmorphone  Injectable 0.5 milliGRAM(s) IV Push every 10 minutes PRN Severe Pain (7 -10)  meperidine     Injectable 12.5 milliGRAM(s) IV Push once PRN Shivering  morphine  - Injectable 2 milliGRAM(s) IV Push every 3 hours PRN Severe Pain (7 - 10)  ondansetron Injectable 4 milliGRAM(s) IV Push every 6 hours PRN Nausea and/or Vomiting  ondansetron Injectable 4 milliGRAM(s) IV Push once PRN Nausea and/or Vomiting  oxyCODONE    IR 5 milliGRAM(s) Oral every 6 hours PRN Moderate Pain (4 - 6)      Vital Signs Last 24 Hrs  T(C): 37.1 (15 Henrik 2023 04:59), Max: 37.1 (15 Henrik 2023 04:59)  T(F): 98.7 (15 Henrik 2023 04:59), Max: 98.7 (15 Henrik 2023 04:59)  HR: 83 (15 Henrik 2023 04:59) (71 - 87)  BP: 146/72 (15 Henrik 2023 04:59) (128/62 - 146/72)  BP(mean): --  RR: 18 (15 Henrki 2023 04:59) (18 - 18)  SpO2: 98% (15 Henrik 2023 04:59) (97% - 100%)    Physical Exam:   Spine  Dressing C/D/I  SILT L2-S2  Motor intact L2-S2  CR<2sec, palpable pulses                           12.0   11.44 )-----------( 159      ( 14 Jun 2023 06:28 )             33.6     06-14    129<L>  |  95<L>  |  19  ----------------------------<  148<H>  5.2<H>   |  20  |  1.3    Ca    8.4      14 Jun 2023 06:28            06-14-23 @ 07:01  -  06-15-23 @ 06:31  --------------------------------------------------------  IN: 225 mL / OUT: 250 mL / NET: -25 mL        A/P: 73yMale s/p L2-L3 Laminectomy and repair of dural tear on 6/13, POD2. Doing well.        Weight bearing: WBAT; may trial OOB / weight bearing today  Abdominal binder  Head of bed elevated to 50 deg this AM  AM labs  DVT ppx: Xarelto  Diet  Pain control  Home medications  PT/OT/rehab  Please page Ortho w/ any questions/concerns

## 2023-06-15 NOTE — DISCHARGE NOTE PROVIDER - NSDCFUSCHEDAPPT_GEN_ALL_CORE_FT
Matthew Brito  [facility] Physician [facility]  [facility] [address]  Scheduled Appointment: 06/28/2023    [facility]  [facility] [address]  Scheduled Appointment: 07/19/2023

## 2023-06-15 NOTE — DISCHARGE NOTE NURSING/CASE MANAGEMENT/SOCIAL WORK - NSDCPEFALRISK_GEN_ALL_CORE
For information on Fall & Injury Prevention, visit: [email or web address] OR  [email or web address] OR  https://www.cdc.gov/steadi/patient.html

## 2023-06-15 NOTE — DISCHARGE NOTE PROVIDER - NSDCMRMEDTOKEN_GEN_ALL_CORE_FT
cyclobenzaprine 10 mg oral tablet: 1 tab(s) orally 3 times a day as needed for Muscle Spasm i tab q8h prn spasm  digoxin 125 mcg (0.125 mg) oral tablet: 1 tab(s) orally once a day  metoprolol succinate 25 mg oral tablet, extended release: 1 tab(s) orally once a day  Mobic 7.5 mg oral tablet: 1 tab(s) orally 3 times a day  olmesartan 20 mg oral tablet: 1 tab(s) orally once a day  oxyCODONE 5 mg oral tablet: 1 tab(s) orally every 6 hours as needed for Moderate Pain (4 - 6) MDD: 4  senna leaf extract oral tablet: 2 tab(s) orally once a day (at bedtime)  Synthroid 125 mcg (0.125 mg) oral tablet: 1 tab(s) orally once a day  tamsulosin 0.4 mg oral capsule: 1 cap(s) orally once a day  Xarelto 20 mg oral tablet: 1 tab(s) orally once a day

## 2023-06-26 ENCOUNTER — APPOINTMENT (OUTPATIENT)
Dept: ORTHOPEDIC SURGERY | Facility: CLINIC | Age: 73
End: 2023-06-26
Payer: MEDICARE

## 2023-06-26 PROCEDURE — 99024 POSTOP FOLLOW-UP VISIT: CPT

## 2023-06-26 NOTE — HISTORY OF PRESENT ILLNESS
[de-identified] : 73-year-old male is almost 2 weeks status post L2-3 laminectomy with an incidental durotomy repair.  Patient is doing well.  His claudicant symptoms are improved.  He has paresthesias are somewhat better.  He feels like he can walk longer distances before his legs feel heavy and weak on him.  He mentions how he went to [employer] and was actually able to stand up during the service without the pain in his legs.  He denies any headaches.

## 2023-06-26 NOTE — DISCUSSION/SUMMARY
[de-identified] : 73-year-old male status post L2-3 lumbar laminectomy on 6/13/2023 with an incidental durotomy repair.  I would like the patient to come back in 1 week for suture removal.  The patient is progressing appropriately.  We discussed that you could have up to a year of improvement and claudicant symptoms after decompression surgery.  I would like the patient to begin physical therapy in about 1 month.  In the meantime of advised him to take short frequent walks.  Patient is happy with his progress.

## 2023-06-26 NOTE — PHYSICAL EXAM
[de-identified] : Incision healing appropriately.  No drainage.  No erythema.  No fluctuance.\par \par Strength                                         \par Hip flexor\par   Right: 5/5; Left: 5/5                             \par Knee extensor  \par   Right: 5/5; Left: 5/5                     \par Ankle dorsiflexion\par   Right: 5/5; Left: 5/5                  \par EHL        \par   Right: 5/5; Left: 5/5                                \par Ankle plantarflexion    \par   Right: 5/5; Left: 5/5\par \par

## 2023-07-07 ENCOUNTER — APPOINTMENT (OUTPATIENT)
Dept: ORTHOPEDIC SURGERY | Facility: CLINIC | Age: 73
End: 2023-07-07
Payer: MEDICARE

## 2023-07-07 PROCEDURE — 99024 POSTOP FOLLOW-UP VISIT: CPT

## 2023-07-07 NOTE — DISCUSSION/SUMMARY
[de-identified] : 73-year-old male status post L2-3 laminectomy and incidental durotomy repair on 6/13/2023.  Patient doing well.  Claudicant symptoms resolved.  He will begin physical therapy.  We removed the stitches today.  He no longer takes any medications for pain postsurgery.  I will see the patient back in about 6 weeks for follow-up.

## 2023-07-07 NOTE — HISTORY OF PRESENT ILLNESS
[de-identified] : 73-year-old male returns status post L2-3 laminectomy with incidental durotomy repair on 6/13/2023.  Patient is doing well.  No headaches.  His preoperative leg claudicant symptoms are significantly improved compared to postop.  He denies fevers or chills.  His only complaint at this point is that he feels that he has much less motivation.  He feels like his whole body is tired at times.  He does not think is related to his back.  He is no longer in any narcotics and he recently stopped this.  He would like to begin physical therapy.

## 2023-07-19 ENCOUNTER — APPOINTMENT (OUTPATIENT)
Dept: PAIN MANAGEMENT | Facility: CLINIC | Age: 73
End: 2023-07-19

## 2023-09-08 ENCOUNTER — APPOINTMENT (OUTPATIENT)
Dept: ORTHOPEDIC SURGERY | Facility: CLINIC | Age: 73
End: 2023-09-08
Payer: MEDICARE

## 2023-09-08 PROCEDURE — 99024 POSTOP FOLLOW-UP VISIT: CPT

## 2023-09-08 NOTE — DISCUSSION/SUMMARY
[de-identified] : 73-year-old male status post L2-3 laminectomy for spinal stenosis with symptoms of neurogenic claudication lumbar radiculopathy.  Some of his symptoms are better however some are still present.  Overall the patient seems satisfied.  He is most worried about his heart and he does have an appointment for follow-up.  He still says his motivation is not what it used to be and he has seen his primary care physician for that who does not seem to have explanation for it.  The patient has just finished his physical therapy and does not want to do anymore.  He said he will do walking exercises on his own now.  I will see him back in 6 months.

## 2023-09-08 NOTE — HISTORY OF PRESENT ILLNESS
[de-identified] : Theodore returns for his follow-up appointment.  He is about 3 months status post L2-3 laminectomy that was complicated by incidental durotomy.  No headaches.  He states his leg pain is better than it was prior to surgery however it is still not perfect.  But the thing that bothers him the most is that he easily becomes out of breath when he is walking.  He does have a history of cardiac issues and has an appointment with his cardiologist.  He thinks the biggest relief in his symptoms was from the cramping and pain in his calves and the strength when he gets up out of a chair but he does still have some heaviness in his legs when he walks.

## 2024-03-04 ENCOUNTER — APPOINTMENT (OUTPATIENT)
Dept: ORTHOPEDIC SURGERY | Facility: CLINIC | Age: 74
End: 2024-03-04

## 2024-03-27 ENCOUNTER — APPOINTMENT (OUTPATIENT)
Dept: ORTHOPEDIC SURGERY | Facility: CLINIC | Age: 74
End: 2024-03-27
Payer: MEDICARE

## 2024-03-27 PROCEDURE — 99214 OFFICE O/P EST MOD 30 MIN: CPT

## 2024-03-27 RX ORDER — GABAPENTIN 300 MG/1
300 CAPSULE ORAL
Qty: 90 | Refills: 3 | Status: ACTIVE | COMMUNITY
Start: 2024-03-27 | End: 1900-01-01

## 2024-03-27 NOTE — HISTORY OF PRESENT ILLNESS
[de-identified] : 74-year-old male presents to me for follow-up.  He is status post L2-3 laminectomy on 6/13/2023 that was complicated by an incidental durotomy.  The patient has no headaches.  The patient has made no improvement since her last visit.  He states that his leg pain marginally improved after surgery but then the improvement failed to continue and he still has some numbness in his legs.  He did physical therapy initially which helped however it is stopped helping him so he stopped going to physical therapy.  He states that he does sleep a lot.  He is not taking any medications for this.

## 2024-03-27 NOTE — DISCUSSION/SUMMARY
[de-identified] : 74-year-old male with neurogenic claudication symptoms status post L2-3 laminectomy on 6/13/2023.  I discussed with the patient that we could get an MRI of his lumbar spine to see if there is any residual compression or worsening stenosis or iatrogenic instability from the surgery given that the laminectomy was done above the adjacent segment.  He does not want to proceed this way at this time.  He states that he does not want any kind of interventions anyways in terms of either injections or surgery.  I offered him some gabapentin to try for the pain and he says he will do so.  He will follow-up With me in 3 months so I can see what his response to the gabapentin is.

## 2024-03-27 NOTE — IMAGING
[de-identified] : TTP midline spine and paraspinal musculature  Strength                                          Hip flexor   Right: 5/5; Left: 5/5                              Knee extensor     Right: 5/5; Left: 5/5                      Ankle dorsiflexion   Right: 5/5; Left: 5/5                   EHL           Right: 5/5; Left: 5/5                                 Ankle plantarflexion       Right: 5/5; Left: 5/5  Sensation L1   Right: 2/2; Left: 2/2 L2   Right: 2/2; Left: 2/2 L3   Right: 2/2; Left: 2/2 L4   Right: 2/2; Left: 2/2 L5   Right: 2/2; Left: 2/2 S1   Right: 2/2; Left: 2/2  Reflexes Patella   Right: 2+; Left 2+ Achilles   Right: 2+; Left 2+ Clonus  Right: absent; L: absent

## 2024-06-28 ENCOUNTER — APPOINTMENT (OUTPATIENT)
Dept: ORTHOPEDIC SURGERY | Facility: CLINIC | Age: 74
End: 2024-06-28
Payer: MEDICARE

## 2024-06-28 DIAGNOSIS — M48.062 SPINAL STENOSIS, LUMBAR REGION WITH NEUROGENIC CLAUDICATION: ICD-10-CM

## 2024-06-28 PROCEDURE — 99214 OFFICE O/P EST MOD 30 MIN: CPT

## 2024-07-03 ENCOUNTER — APPOINTMENT (OUTPATIENT)
Dept: MRI IMAGING | Facility: CLINIC | Age: 74
End: 2024-07-03
Payer: MEDICARE

## 2024-07-03 PROCEDURE — 72148 MRI LUMBAR SPINE W/O DYE: CPT | Mod: MH

## 2024-07-10 ENCOUNTER — APPOINTMENT (OUTPATIENT)
Dept: ORTHOPEDIC SURGERY | Facility: CLINIC | Age: 74
End: 2024-07-10
Payer: MEDICARE

## 2024-07-10 DIAGNOSIS — M48.062 SPINAL STENOSIS, LUMBAR REGION WITH NEUROGENIC CLAUDICATION: ICD-10-CM

## 2024-07-10 PROCEDURE — 99214 OFFICE O/P EST MOD 30 MIN: CPT

## 2024-07-26 ENCOUNTER — APPOINTMENT (OUTPATIENT)
Dept: NEUROLOGY | Facility: CLINIC | Age: 74
End: 2024-07-26
Payer: MEDICARE

## 2024-07-26 VITALS
DIASTOLIC BLOOD PRESSURE: 69 MMHG | HEART RATE: 102 BPM | BODY MASS INDEX: 24.38 KG/M2 | SYSTOLIC BLOOD PRESSURE: 133 MMHG | WEIGHT: 190 LBS | HEIGHT: 74 IN

## 2024-07-26 DIAGNOSIS — G20.C PARKINSONISM, UNSPECIFIED: ICD-10-CM

## 2024-07-26 DIAGNOSIS — M48.062 SPINAL STENOSIS, LUMBAR REGION WITH NEUROGENIC CLAUDICATION: ICD-10-CM

## 2024-07-26 DIAGNOSIS — G62.9 POLYNEUROPATHY, UNSPECIFIED: ICD-10-CM

## 2024-07-26 DIAGNOSIS — R26.81 UNSTEADINESS ON FEET: ICD-10-CM

## 2024-07-26 PROCEDURE — 99204 OFFICE O/P NEW MOD 45 MIN: CPT

## 2024-07-26 PROCEDURE — G2211 COMPLEX E/M VISIT ADD ON: CPT

## 2024-07-26 RX ORDER — METOPROLOL TARTRATE 25 MG/1
25 TABLET, FILM COATED ORAL
Refills: 0 | Status: ACTIVE | COMMUNITY

## 2024-07-26 RX ORDER — LEVOTHYROXINE SODIUM 125 UG/1
125 TABLET ORAL
Refills: 0 | Status: ACTIVE | COMMUNITY

## 2024-07-26 RX ORDER — RIVAROXABAN 20 MG/1
20 TABLET, FILM COATED ORAL
Refills: 0 | Status: ACTIVE | COMMUNITY

## 2024-07-26 RX ORDER — DIGOXIN 125 MCG
0.12 TABLET ORAL
Refills: 0 | Status: ACTIVE | COMMUNITY

## 2024-07-26 RX ORDER — OLMESARTAN MEDOXOMIL 40 MG/1
TABLET, FILM COATED ORAL
Refills: 0 | Status: ACTIVE | COMMUNITY

## 2024-07-26 RX ORDER — TAMSULOSIN HYDROCHLORIDE 0.4 MG/1
0.4 CAPSULE ORAL
Refills: 0 | Status: ACTIVE | COMMUNITY

## 2024-07-26 NOTE — PHYSICAL EXAM
[Person] : oriented to person [Place] : oriented to place [Time] : oriented to time [Concentration Intact] : normal concentrating ability [Visual Intact] : visual attention was ~T not ~L decreased [Naming Objects] : no difficulty naming common objects [Repeating Phrases] : no difficulty repeating a phrase [Writing A Sentence] : no difficulty writing a sentence [Fluency] : fluency intact [Comprehension] : comprehension intact [Reading] : reading intact [Past History] : adequate knowledge of personal past history [Cranial Nerves Optic (II)] : visual acuity intact bilaterally,  visual fields full to confrontation, pupils equal round and reactive to light [Cranial Nerves Oculomotor (III)] : extraocular motion intact [Cranial Nerves Trigeminal (V)] : facial sensation intact symmetrically [Cranial Nerves Facial (VII)] : face symmetrical [Cranial Nerves Vestibulocochlear (VIII)] : hearing was intact bilaterally [Cranial Nerves Glossopharyngeal (IX)] : tongue and palate midline [Cranial Nerves Accessory (XI - Cranial And Spinal)] : head turning and shoulder shrug symmetric [Cranial Nerves Hypoglossal (XII)] : there was no tongue deviation with protrusion [Motor Tone] : muscle tone was normal in all four extremities [Motor Strength] : muscle strength was normal in all four extremities [No Muscle Atrophy] : normal bulk in all four extremities [0] : Ankle jerk left 0 [Plantar Reflex Right Only] : normal on the right [Plantar Reflex Left Only] : normal on the left [FreeTextEntry5] : Positive glabellar sign, submental tremor noted [FreeTextEntry6] : Cogwheel rigidity bilaterally [FreeTextEntry7] : Decree sensation in the lower extremities below the knee 30% of normal [FreeTextEntry8] : Positive Romberg, walks with cane

## 2024-07-26 NOTE — ASSESSMENT
[FreeTextEntry1] : Parkinsonism - MRI of the brain without gadolinium, and may consider DaTscan afterwards  lumbar spinal stenosis/Polyneuropathy - EMG/NCS of the lower extremities - If polyneuropathy confirmed, we will send him for blood work for workup next time which will coincide with his yearly blood work - Physical therapy for fall prevention and gait training  Total clinician time spent  is  46 minutes including preparing to see the patient, obtaining and/or reviewing and confirming history, performing a medically necessary and appropriate examination, counseling and educating the patient and/or family, documenting clinical information in the HER and communicating and/or referring to other healthcare professionals.     Signature. I, Melanie Bocanegra attest that this document has been prepared under the direction and in the presence of Provider Bethel Vickers DO   Thank You for letting me assist in the management of this patient.   Everett Vickers DO Board Certified, Neurology

## 2024-07-26 NOTE — HISTORY OF PRESENT ILLNESS
[FreeTextEntry1] : It's a pleasure to see Mr. EMELINA BRIDGES In the office today. He is a 74 year -  old man  who is referred by orthopedics for the evaluation of unsteady/shuffling gait.  He has been seeing orthopedic/spinal surgery for lumbar spondylosis/stenosis and has had spinal surgery in the past.  It was noted that patient has a shuffling gait which was also noticed by his wife and patient himself complains of symmetrical numbness from the knees down which has been going on for about a year.  He denies any worsening back pain or pain going down his legs.  He has been using a 4-prong cane for ambulation and has not had any falls.  He also denies bradykinesia, tremors, change in voice, visual hallucinations.

## 2024-08-16 ENCOUNTER — APPOINTMENT (OUTPATIENT)
Dept: NEUROLOGY | Facility: CLINIC | Age: 74
End: 2024-08-16
Payer: MEDICARE

## 2024-08-16 PROCEDURE — 95886 MUSC TEST DONE W/N TEST COMP: CPT

## 2024-08-16 PROCEDURE — 95912 NRV CNDJ TEST 11-12 STUDIES: CPT

## 2024-08-19 ENCOUNTER — APPOINTMENT (OUTPATIENT)
Dept: MRI IMAGING | Facility: CLINIC | Age: 74
End: 2024-08-19
Payer: MEDICARE

## 2024-08-19 PROCEDURE — 70551 MRI BRAIN STEM W/O DYE: CPT | Mod: MH

## 2024-09-05 ENCOUNTER — APPOINTMENT (OUTPATIENT)
Dept: NEUROLOGY | Facility: CLINIC | Age: 74
End: 2024-09-05
Payer: MEDICARE

## 2024-09-05 VITALS
HEART RATE: 96 BPM | BODY MASS INDEX: 24.38 KG/M2 | HEIGHT: 74 IN | WEIGHT: 190 LBS | SYSTOLIC BLOOD PRESSURE: 103 MMHG | DIASTOLIC BLOOD PRESSURE: 65 MMHG

## 2024-09-05 DIAGNOSIS — G20.C PARKINSONISM, UNSPECIFIED: ICD-10-CM

## 2024-09-05 DIAGNOSIS — M48.062 SPINAL STENOSIS, LUMBAR REGION WITH NEUROGENIC CLAUDICATION: ICD-10-CM

## 2024-09-05 DIAGNOSIS — G62.9 POLYNEUROPATHY, UNSPECIFIED: ICD-10-CM

## 2024-09-05 PROCEDURE — 99214 OFFICE O/P EST MOD 30 MIN: CPT

## 2024-09-05 NOTE — ASSESSMENT
[FreeTextEntry1] : Parkinsonism -MRI of the brain reviewed, results discussed with patient. - DaTscan  lumbar spinal stenosis/Polyneuropathy -EMG/NCS of the lower extremities reviewed and discussed with patient - Blood work to look for secondary causes of polyneuropathy - Physical therapy for fall prevention and gait training   Total clinician time spent  is  31 minutes including preparing to see the patient, obtaining and/or reviewing and confirming history, performing a medically necessary and appropriate examination, counseling and educating the patient and/or family, documenting clinical information in the HER and communicating and/or referring to other healthcare professionals.  Signature. I, Melanie Bocanegra attest that this document has been prepared under the direction and in the presence of Provider Bethel Vickers DO   Thank You for letting me assist in the management of this patient.   Everett Vickers DO Board Certified, Neurology

## 2024-09-05 NOTE — HISTORY OF PRESENT ILLNESS
[FreeTextEntry1] : Theodore Shah returns to the office for follow-up and prior history and physical have been reviewed and he reports no change since last visit.  He was last seen for the evaluation of unsteady gait which was thought to due to possible neuropathy as well as Parkinson's disease.  NCS of the lower extremities did confirm the presence of polyneuropathy.  MRI of the brain did not show any significant pathology to explain the symptoms of Parkinson's disease.  Clinically patient reports no significant change compared to last seen 2 months ago.  He is still seen orthopedics for his lumbar spinal stenosis.  It's a pleasure to see Mr. THEODORE SHAH In the office today. He is a 74 year -  old man  who is referred by orthopedics for the evaluation of unsteady/shuffling gait.  He has been seeing orthopedic/spinal surgery for lumbar spondylosis/stenosis and has had spinal surgery in the past.  It was noted that patient has a shuffling gait which was also noticed by his wife and patient himself complains of symmetrical numbness from the knees down which has been going on for about a year.  He denies any worsening back pain or pain going down his legs.  He has been using a 4-prong cane for ambulation and has not had any falls.  He also denies bradykinesia, tremors, change in voice, visual hallucinations.

## 2024-09-05 NOTE — PHYSICAL EXAM
[Person] : oriented to person [Place] : oriented to place [Time] : oriented to time [Concentration Intact] : normal concentrating ability [Visual Intact] : visual attention was ~T not ~L decreased [Naming Objects] : no difficulty naming common objects [Repeating Phrases] : no difficulty repeating a phrase [Writing A Sentence] : no difficulty writing a sentence [Fluency] : fluency intact [Comprehension] : comprehension intact [Reading] : reading intact [Past History] : adequate knowledge of personal past history [Cranial Nerves Optic (II)] : visual acuity intact bilaterally,  visual fields full to confrontation, pupils equal round and reactive to light [Cranial Nerves Oculomotor (III)] : extraocular motion intact [Cranial Nerves Trigeminal (V)] : facial sensation intact symmetrically [Cranial Nerves Facial (VII)] : face symmetrical [Cranial Nerves Vestibulocochlear (VIII)] : hearing was intact bilaterally [Cranial Nerves Glossopharyngeal (IX)] : tongue and palate midline [Cranial Nerves Accessory (XI - Cranial And Spinal)] : head turning and shoulder shrug symmetric [Cranial Nerves Hypoglossal (XII)] : there was no tongue deviation with protrusion [Motor Tone] : muscle tone was normal in all four extremities [Motor Strength] : muscle strength was normal in all four extremities [No Muscle Atrophy] : normal bulk in all four extremities [0] : Ankle jerk left 0 [Plantar Reflex Right Only] : normal on the right [Plantar Reflex Left Only] : normal on the left [FreeTextEntry5] : Positive glabellar sign, submental tremor noted [FreeTextEntry7] : Decree sensation in the lower extremities below the knee 30% of normal [FreeTextEntry6] : Cogwheel rigidity bilaterally [FreeTextEntry8] : Positive Romberg, walks with cane

## 2024-09-30 ENCOUNTER — EMERGENCY (EMERGENCY)
Facility: HOSPITAL | Age: 74
LOS: 0 days | Discharge: HOME | End: 2024-10-01
Attending: EMERGENCY MEDICINE
Payer: MEDICARE

## 2024-09-30 VITALS
OXYGEN SATURATION: 100 % | RESPIRATION RATE: 18 BRPM | DIASTOLIC BLOOD PRESSURE: 62 MMHG | SYSTOLIC BLOOD PRESSURE: 106 MMHG | HEIGHT: 73 IN | HEART RATE: 83 BPM | TEMPERATURE: 98 F | WEIGHT: 184.97 LBS

## 2024-09-30 DIAGNOSIS — I10 ESSENTIAL (PRIMARY) HYPERTENSION: ICD-10-CM

## 2024-09-30 DIAGNOSIS — R42 DIZZINESS AND GIDDINESS: ICD-10-CM
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
## 2024-09-30 DIAGNOSIS — I48.91 UNSPECIFIED ATRIAL FIBRILLATION: ICD-10-CM

## 2024-09-30 DIAGNOSIS — Z98.890 OTHER SPECIFIED POSTPROCEDURAL STATES: Chronic | ICD-10-CM

## 2024-09-30 DIAGNOSIS — R55 SYNCOPE AND COLLAPSE: ICD-10-CM

## 2024-09-30 DIAGNOSIS — Z98.1 ARTHRODESIS STATUS: Chronic | ICD-10-CM

## 2024-09-30 DIAGNOSIS — E03.9 HYPOTHYROIDISM, UNSPECIFIED: ICD-10-CM
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
## 2024-09-30 DIAGNOSIS — M48.061 SPINAL STENOSIS, LUMBAR REGION WITHOUT NEUROGENIC CLAUDICATION: ICD-10-CM

## 2024-09-30 DIAGNOSIS — Z79.01 LONG TERM (CURRENT) USE OF ANTICOAGULANTS: ICD-10-CM

## 2024-09-30 DIAGNOSIS — S00.03XA CONTUSION OF SCALP, INITIAL ENCOUNTER: ICD-10-CM

## 2024-09-30 LAB
ALBUMIN SERPL ELPH-MCNC: 4.7 G/DL — SIGNIFICANT CHANGE UP (ref 3.5–5.2)
ALP SERPL-CCNC: 60 U/L — SIGNIFICANT CHANGE UP (ref 30–115)
ALT FLD-CCNC: 20 U/L — SIGNIFICANT CHANGE UP (ref 0–41)
ANION GAP SERPL CALC-SCNC: 15 MMOL/L — HIGH (ref 7–14)
APTT BLD: 30.7 SEC — SIGNIFICANT CHANGE UP (ref 27–39.2)
AST SERPL-CCNC: 28 U/L — SIGNIFICANT CHANGE UP (ref 0–41)
BASOPHILS # BLD AUTO: 0.1 K/UL — SIGNIFICANT CHANGE UP (ref 0–0.2)
BASOPHILS NFR BLD AUTO: 1.3 % — HIGH (ref 0–1)
BILIRUB SERPL-MCNC: 0.6 MG/DL — SIGNIFICANT CHANGE UP (ref 0.2–1.2)
BUN SERPL-MCNC: 14 MG/DL — SIGNIFICANT CHANGE UP (ref 10–20)
CALCIUM SERPL-MCNC: 9.6 MG/DL — SIGNIFICANT CHANGE UP (ref 8.4–10.5)
CHLORIDE SERPL-SCNC: 95 MMOL/L — LOW (ref 98–110)
CO2 SERPL-SCNC: 22 MMOL/L — SIGNIFICANT CHANGE UP (ref 17–32)
CREAT SERPL-MCNC: 1.3 MG/DL — SIGNIFICANT CHANGE UP (ref 0.7–1.5)
DIGOXIN SERPL-MCNC: 0.6 NG/ML — LOW (ref 0.8–2)
EGFR: 57 ML/MIN/1.73M2 — LOW
EOSINOPHIL # BLD AUTO: 0.17 K/UL — SIGNIFICANT CHANGE UP (ref 0–0.7)
EOSINOPHIL NFR BLD AUTO: 2.2 % — SIGNIFICANT CHANGE UP (ref 0–8)
ETHANOL SERPL-MCNC: 88 MG/DL — HIGH
GLUCOSE SERPL-MCNC: 100 MG/DL — HIGH (ref 70–99)
HCT VFR BLD CALC: 40.6 % — LOW (ref 42–52)
HGB BLD-MCNC: 13.7 G/DL — LOW (ref 14–18)
IMM GRANULOCYTES NFR BLD AUTO: 0.4 % — HIGH (ref 0.1–0.3)
INR BLD: 1.29 RATIO — SIGNIFICANT CHANGE UP (ref 0.65–1.3)
LACTATE SERPL-SCNC: 1.7 MMOL/L — SIGNIFICANT CHANGE UP (ref 0.7–2)
LACTATE SERPL-SCNC: 3.3 MMOL/L — HIGH (ref 0.7–2)
LIDOCAIN IGE QN: 75 U/L — HIGH (ref 7–60)
LYMPHOCYTES # BLD AUTO: 2.77 K/UL — SIGNIFICANT CHANGE UP (ref 1.2–3.4)
LYMPHOCYTES # BLD AUTO: 36.3 % — SIGNIFICANT CHANGE UP (ref 20.5–51.1)
MCHC RBC-ENTMCNC: 33.7 G/DL — SIGNIFICANT CHANGE UP (ref 32–37)
MCHC RBC-ENTMCNC: 33.7 PG — HIGH (ref 27–31)
MCV RBC AUTO: 99.8 FL — HIGH (ref 80–94)
MONOCYTES # BLD AUTO: 0.74 K/UL — HIGH (ref 0.1–0.6)
MONOCYTES NFR BLD AUTO: 9.7 % — HIGH (ref 1.7–9.3)
NEUTROPHILS # BLD AUTO: 3.83 K/UL — SIGNIFICANT CHANGE UP (ref 1.4–6.5)
NEUTROPHILS NFR BLD AUTO: 50.1 % — SIGNIFICANT CHANGE UP (ref 42.2–75.2)
NRBC # BLD: 0 /100 WBCS — SIGNIFICANT CHANGE UP (ref 0–0)
PLATELET # BLD AUTO: 196 K/UL — SIGNIFICANT CHANGE UP (ref 130–400)
PMV BLD: 10.1 FL — SIGNIFICANT CHANGE UP (ref 7.4–10.4)
POTASSIUM SERPL-MCNC: 4.6 MMOL/L — SIGNIFICANT CHANGE UP (ref 3.5–5)
POTASSIUM SERPL-SCNC: 4.6 MMOL/L — SIGNIFICANT CHANGE UP (ref 3.5–5)
PROT SERPL-MCNC: 7.6 G/DL — SIGNIFICANT CHANGE UP (ref 6–8)
PROTHROM AB SERPL-ACNC: 14.7 SEC — HIGH (ref 9.95–12.87)
RBC # BLD: 4.07 M/UL — LOW (ref 4.7–6.1)
RBC # FLD: 12.6 % — SIGNIFICANT CHANGE UP (ref 11.5–14.5)
SODIUM SERPL-SCNC: 132 MMOL/L — LOW (ref 135–146)
TROPONIN T, HIGH SENSITIVITY RESULT: 20 NG/L — SIGNIFICANT CHANGE UP (ref 6–21)
TROPONIN T, HIGH SENSITIVITY RESULT: 25 NG/L — HIGH (ref 6–21)
WBC # BLD: 7.64 K/UL — SIGNIFICANT CHANGE UP (ref 4.8–10.8)
WBC # FLD AUTO: 7.64 K/UL — SIGNIFICANT CHANGE UP (ref 4.8–10.8)

## 2024-09-30 PROCEDURE — 80162 ASSAY OF DIGOXIN TOTAL: CPT

## 2024-09-30 PROCEDURE — 70450 CT HEAD/BRAIN W/O DYE: CPT | Mod: MC

## 2024-09-30 PROCEDURE — 84484 ASSAY OF TROPONIN QUANT: CPT

## 2024-09-30 PROCEDURE — 99285 EMERGENCY DEPT VISIT HI MDM: CPT | Mod: 25

## 2024-09-30 PROCEDURE — 83605 ASSAY OF LACTIC ACID: CPT | Mod: 59

## 2024-09-30 PROCEDURE — 72125 CT NECK SPINE W/O DYE: CPT | Mod: MC

## 2024-09-30 PROCEDURE — 93005 ELECTROCARDIOGRAM TRACING: CPT

## 2024-09-30 PROCEDURE — 85610 PROTHROMBIN TIME: CPT

## 2024-09-30 PROCEDURE — 82962 GLUCOSE BLOOD TEST: CPT

## 2024-09-30 PROCEDURE — 85730 THROMBOPLASTIN TIME PARTIAL: CPT

## 2024-09-30 PROCEDURE — 70450 CT HEAD/BRAIN W/O DYE: CPT | Mod: 26,MC

## 2024-09-30 PROCEDURE — 80053 COMPREHEN METABOLIC PANEL: CPT

## 2024-09-30 PROCEDURE — 93010 ELECTROCARDIOGRAM REPORT: CPT

## 2024-09-30 PROCEDURE — 80307 DRUG TEST PRSMV CHEM ANLYZR: CPT

## 2024-09-30 PROCEDURE — 70486 CT MAXILLOFACIAL W/O DYE: CPT | Mod: 26,MC

## 2024-09-30 PROCEDURE — 36000 PLACE NEEDLE IN VEIN: CPT

## 2024-09-30 PROCEDURE — 85025 COMPLETE CBC W/AUTO DIFF WBC: CPT

## 2024-09-30 PROCEDURE — 71045 X-RAY EXAM CHEST 1 VIEW: CPT | Mod: 26

## 2024-09-30 PROCEDURE — 70486 CT MAXILLOFACIAL W/O DYE: CPT | Mod: MC

## 2024-09-30 PROCEDURE — 71045 X-RAY EXAM CHEST 1 VIEW: CPT

## 2024-09-30 PROCEDURE — 72125 CT NECK SPINE W/O DYE: CPT | Mod: 26,MC

## 2024-09-30 PROCEDURE — 83690 ASSAY OF LIPASE: CPT

## 2024-09-30 PROCEDURE — 99285 EMERGENCY DEPT VISIT HI MDM: CPT | Mod: GC

## 2024-09-30 PROCEDURE — 36415 COLL VENOUS BLD VENIPUNCTURE: CPT

## 2024-09-30 RX ORDER — SODIUM CHLORIDE 9 MG/ML
500 INJECTION INTRAMUSCULAR; INTRAVENOUS; SUBCUTANEOUS ONCE
Refills: 0 | Status: COMPLETED | OUTPATIENT
Start: 2024-09-30 | End: 2024-09-30

## 2024-09-30 RX ADMIN — Medication 500 MILLILITER(S): at 23:11

## 2024-09-30 RX ADMIN — SODIUM CHLORIDE 500 MILLILITER(S): 9 INJECTION INTRAMUSCULAR; INTRAVENOUS; SUBCUTANEOUS at 21:29

## 2024-09-30 NOTE — CONSULT NOTE ADULT - CONSULT REASON
[Chaperone Present] : A chaperone was present in the examining room during all aspects of the physical examination S/p unwitnessed fall +HT, +LOC, +AC [03370] : A chaperone was present during the pelvic exam. [FreeTextEntry2] : Luz Maria [Appropriately responsive] : appropriately responsive [Alert] : alert [No Acute Distress] : no acute distress [Soft] : soft [Non-tender] : non-tender [Non-distended] : non-distended [No HSM] : No HSM [No Lesions] : no lesions [No Mass] : no mass [Oriented x3] : oriented x3 [Examination Of The Breasts] : a normal appearance [No Masses] : no breast masses were palpable [Labia Majora] : normal [Labia Minora] : normal [Normal] : normal [Uterine Adnexae] : normal

## 2024-09-30 NOTE — ED ADULT NURSE NOTE - OBJECTIVE STATEMENT
74yr old male, presenting to ED c/o fall. Pt c/o fall off chair. (+)HT/AC use, on Xarelto. Denies (-)LOC. Denies n/v/d/fevers/chills. Pt A&Ox4, ambulatory w/ cane.   Fall precautios implemented, BA in place, red socks utilized. Pt on TELE/O2 monitor. Family w/ pt.

## 2024-09-30 NOTE — CONSULT NOTE ADULT - ATTENDING COMMENTS
Trauma Attending Note Attestation  Patient was examined and evaluated at the bedside at 8:40 PM on 9/30. Medications, radiological studies and all other relevant studies reviewed.     History as above. Has been having multiple falls. Reports neck pain.    Vital Signs Last 24 Hrs  T(C): 36.6 (30 Sep 2024 20:36), Max: 36.6 (30 Sep 2024 20:36)  T(F): 97.8 (30 Sep 2024 20:36), Max: 97.8 (30 Sep 2024 20:36)  HR: 83 (30 Sep 2024 20:36) (83 - 83)  BP: 106/62 (30 Sep 2024 20:36) (106/62 - 106/62)  BP(mean): --  RR: 18 (30 Sep 2024 20:36) (18 - 18)  SpO2: 100% (30 Sep 2024 20:36) (100% - 100%)    Parameters below as of 30 Sep 2024 20:36  Patient On (Oxygen Delivery Method): room air    Primary:  Airway - intact  Breathing - breath sounds bilaterally  Circulation - 2+ throughout  Disability - GCS 15, moving all extremities  Exposure - patient was exposed    I independently performed a medically appropriate exam. I have made revisions to the physical exam in the note above and agree with the exam as written.                          13.7   7.64  )-----------( 196      ( 30 Sep 2024 20:40 )             40.6     09-30    132[L]  |  95[L]  |  14  ----------------------------<  100[H]  4.6   |  22  |  1.3    Ca 9.6; Mg x ; Phos x       ( 30 Sep 2024 20:40 )  Alb: 4.7 g/dL / Pro: 7.6 g/dL / AlkPhos: 60 U/L / ALT: 20 U/L / AST: 28 U/L / GGT:x     / Tbili 0.6 mg/dL/ Dbili x     / Indbili x        PT/INR/PTT - ( 30 Sep 2024 20:40 )   PT: 14.70 sec; INR: 1.29 ratio; PTT 30.7 sec    Lactate, Blood: 1.7 mmol/L (09-30 @ 22:49)  Lactate, Blood: 3.3 mmol/L (09-30 @ 20:40)    CXR reviewed and interpreted by me - no hemothorax/pneumothorax  CTH/Csp reviewed and interpreted by me - no acute fractures or intracranial hemorrhage  CT Maxillofacial bones reviewed and interpreted by me - no acute traumatic injuries    Assessment/Plan:  74y Male PMH HTN, hypothyroidism, afib on xarelto s/p unwitnessed fall at home while anticoagulated with head strike. Found to have R posterior scalp hematoma. Recommended CT scans of head and C spine. No acute traumatic injuries identified. No acute traumatic injuries identified on CT Max/face. Consider medical admission for evaluation of recurrent falls. If admitted, trauma will perform tertiary survey.    Brian Ramos MD  Trauma/Acute Care Surgery/Surgical Critical Care Attending

## 2024-09-30 NOTE — CONSULT NOTE ADULT - SUBJECTIVE AND OBJECTIVE BOX
TRAUMA ACTIVATION LEVEL:  ALERT   ACTIVATED BY:  ED  INTUBATED: NO      MECHANISM OF INJURY:   [] Blunt     [] MVC	  [x] Fall	  [] Pedestrian Struck	  [] Motorcycle     [] Assault     [] Bicycle collision    [] Sports injury    [] Penetrating    [] Gun Shot Wound      [] Stab Wound    GCS: 15 	E: 4	V: 5	M: 6    HPI:  74yM w/ PMHx of HTN, hypothyroid, AFIB on Xarelto, lumbar spinal stenosis s/p lumbar laminectomy, h/o knee surgery, seen as Trauma Alert s/p unwitnessed fall at home onto ceramic tile +HT, +LOC, +AC. Patient states he was feeling dizzy and then lost consciousness, waking up on the floor on his back. Patient was ambulatory after fall and endorses pain to neck. Patient has an appointment scheduled for next week with cardiologist for evaluation of dizziness x 6. Trauma assessment in ED: ABCs intact , GCS 15 , AAOx3. Physical signs of trauma include right posterior scalp hematoma. Trauma workup imaging pending.    PAST MEDICAL & SURGICAL HISTORY:  Hypothyroid  Hypertension  Afib  History of BPH  H/O low back pain  Skin cancer  H/O spinal fusion  H/O laminectomy  H/O knee surgery  Status post Mohs surgery    Allergies  No Known Allergies      Home Medications:  digoxin 125 mcg (0.125 mg) oral tablet: 1 tab(s) orally once a day (13 Jun 2023 11:15)  metoprolol succinate 25 mg oral tablet, extended release: 1 tab(s) orally once a day (13 Jun 2023 11:15)  olmesartan 20 mg oral tablet: 1 tab(s) orally once a day (13 Jun 2023 11:15)  senna leaf extract oral tablet: 2 tab(s) orally once a day (at bedtime) (08 Sep 2023 10:39)  Synthroid 125 mcg (0.125 mg) oral tablet: 1 tab(s) orally once a day (13 Jun 2023 11:15)  tamsulosin 0.4 mg oral capsule: 1 cap(s) orally once a day (13 Jun 2023 11:15)  Xarelto 20 mg oral tablet: 1 tab(s) orally once a day (13 Jun 2023 11:15)      ROS: 10-system review is otherwise negative except HPI above.      Primary Survey:    A - airway intact  B - bilateral breath sounds and good chest rise  C - palpable pulses in all extremities  D - GCS 15 on arrival, PINTO  Exposure obtained    Vital Signs Last 24 Hrs  T(C): 36.6 (30 Sep 2024 20:36), Max: 36.6 (30 Sep 2024 20:36)  T(F): 97.8 (30 Sep 2024 20:36), Max: 97.8 (30 Sep 2024 20:36)  HR: 83 (30 Sep 2024 20:36) (83 - 83)  BP: 106/62 (30 Sep 2024 20:36) (106/62 - 106/62)  BP(mean): --  RR: 18 (30 Sep 2024 20:36) (18 - 18)  SpO2: 100% (30 Sep 2024 20:36) (100% - 100%)    Parameters below as of 30 Sep 2024 20:36  Patient On (Oxygen Delivery Method): room air      Secondary Survey:   General: NAD  HEENT: + right posterior scalp hematoma. No scalp lacerations.  Neck: Soft, midline trachea. Mild c-spine tenderness  Chest: No chest wall tenderness, no subcutaneous emphysema.   Cardiac: S1, S2, RRR  Respiratory: Bilateral breath sounds, clear and equal bilaterally  Abdomen: Soft, non-distended, non-tender, no rebound, no guarding.  Groin: Normal appearing, pelvis stable   Ext:  Moving b/l upper and lower extremities.  Back: No T/L/S spine tenderness, No palpable runoff/stepoff/deformity    ACCESS / DEVICES:  [x] Peripheral IV  [ ] Central Venous Line	[ ] R	[ ] L	[ ] IJ	[ ] Fem	[ ] SC	Placed:   [ ] Arterial Line		[ ] R	[ ] L	[ ] Fem	[ ] Rad	[ ] Ax	Placed:   [ ] PICC:					[ ] Mediport  [ ] Urinary Catheter,  Date Placed:   [ ] Chest tube: [ ] Right, [ ] Left  [ ] WAGNER/Phong Drains    Labs:  CAPILLARY BLOOD GLUCOSE      POCT Blood Glucose.: 108 mg/dL (30 Sep 2024 20:43)                          13.7   7.64  )-----------( 196      ( 30 Sep 2024 20:40 )             40.6       Auto Neutrophil %: 50.1 % (09-30-24 @ 20:40)  Auto Immature Granulocyte %: 0.4 % (09-30-24 @ 20:40)    09-30    132[L]  |  95[L]  |  14  ----------------------------<  100[H]  4.6   |  22  |  1.3      Calcium: 9.6 mg/dL (09-30-24 @ 20:40)      LFTs:             7.6  | 0.6  | 28       ------------------[60      ( 30 Sep 2024 20:40 )  4.7  | x    | 20          Lipase:75     Amylase:x        Alcohol, Blood: 88 mg/dL (09-30-24 @ 20:40)    Urinalysis Basic - ( 30 Sep 2024 20:40 )    Color: x / Appearance: x / SG: x / pH: x  Gluc: 100 mg/dL / Ketone: x  / Bili: x / Urobili: x   Blood: x / Protein: x / Nitrite: x   Leuk Esterase: x / RBC: x / WBC x   Sq Epi: x / Non Sq Epi: x / Bacteria: x        Alcohol, Blood: 88 mg/dL (09-30-24 @ 20:40)      RADIOLOGY & ADDITIONAL STUDIES:  ---------------------------------------------------------------------------------------    ASSESSMENT:  74yM w/ PMHx of HTN, hypothyroid, AFIB on Xarelto, lumbar spinal stenosis s/p lumbar laminectomy, h/o knee surgery, seen as Trauma Alert s/p unwitnessed fall at home onto ceramic tile +HT, +LOC, +AC. Patient states he was feeling dizzy and then lost consciousness, waking up on the floor on his back. Trauma assessment in ED: ABCs intact , GCS 15 , AAOx3. Physical signs of trauma include right posterior scalp hematoma.     Injuries identified:   - Right posterior scalp hematoma    PLAN:   - Trauma Labs pending:  (CBC, BMP, Coags, T&S, UA, EtOH level)      Trauma Imaging to include the following:  - CXR, Pelvic Xray  - CT Head,  CT C-spine, CT Max/Face, CT Chest, CT Abd/Pelvis  - Extremity films: None    Disposition pending results of above labs and imaging  Above plan to be discussed with Trauma attending, Dr. Ramos, patient, nd ED team  --------------------------------------------------------------------------------------  09-30-24 @ 21:43 TRAUMA ACTIVATION LEVEL:  ALERT   ACTIVATED BY:  ED  INTUBATED: NO      MECHANISM OF INJURY:   [] Blunt     [] MVC	  [x] Fall	  [] Pedestrian Struck	  [] Motorcycle     [] Assault     [] Bicycle collision    [] Sports injury    [] Penetrating    [] Gun Shot Wound      [] Stab Wound    GCS: 15 	E: 4	V: 5	M: 6    HPI:  74yM w/ PMHx of HTN, hypothyroid, AFIB on Xarelto, lumbar spinal stenosis s/p lumbar laminectomy, h/o knee surgery, seen as Trauma Alert s/p unwitnessed fall at home onto ceramic tile +HT, +LOC, +AC. Patient states he was feeling dizzy and then lost consciousness, waking up on the floor on his back. Patient was ambulatory after fall and endorses pain to neck. Patient has an appointment scheduled for next week with cardiologist for evaluation of dizziness x 6w. Trauma assessment in ED: ABCs intact , GCS 15 , AAOx3. Physical signs of trauma include right posterior scalp hematoma. Trauma workup imaging pending.    PAST MEDICAL & SURGICAL HISTORY:  Hypothyroid  Hypertension  Afib  History of BPH  H/O low back pain  Skin cancer  H/O spinal fusion  H/O laminectomy  H/O knee surgery  Status post Mohs surgery    Allergies  No Known Allergies      Home Medications:  digoxin 125 mcg (0.125 mg) oral tablet: 1 tab(s) orally once a day (13 Jun 2023 11:15)  metoprolol succinate 25 mg oral tablet, extended release: 1 tab(s) orally once a day (13 Jun 2023 11:15)  olmesartan 20 mg oral tablet: 1 tab(s) orally once a day (13 Jun 2023 11:15)  senna leaf extract oral tablet: 2 tab(s) orally once a day (at bedtime) (08 Sep 2023 10:39)  Synthroid 125 mcg (0.125 mg) oral tablet: 1 tab(s) orally once a day (13 Jun 2023 11:15)  tamsulosin 0.4 mg oral capsule: 1 cap(s) orally once a day (13 Jun 2023 11:15)  Xarelto 20 mg oral tablet: 1 tab(s) orally once a day (13 Jun 2023 11:15)      ROS: 10-system review is otherwise negative except HPI above.      Primary Survey:    A - airway intact  B - bilateral breath sounds and good chest rise  C - palpable pulses in all extremities  D - GCS 15 on arrival, PINTO  Exposure obtained    Vital Signs Last 24 Hrs  T(C): 36.6 (30 Sep 2024 20:36), Max: 36.6 (30 Sep 2024 20:36)  T(F): 97.8 (30 Sep 2024 20:36), Max: 97.8 (30 Sep 2024 20:36)  HR: 83 (30 Sep 2024 20:36) (83 - 83)  BP: 106/62 (30 Sep 2024 20:36) (106/62 - 106/62)  BP(mean): --  RR: 18 (30 Sep 2024 20:36) (18 - 18)  SpO2: 100% (30 Sep 2024 20:36) (100% - 100%)    Parameters below as of 30 Sep 2024 20:36  Patient On (Oxygen Delivery Method): room air      Secondary Survey:   General: NAD  HEENT: + right posterior scalp hematoma. No scalp lacerations.  Neck: Soft, midline trachea. Mild c-spine tenderness  Chest: No chest wall tenderness, no subcutaneous emphysema.   Cardiac: S1, S2, RRR  Respiratory: Bilateral breath sounds, clear and equal bilaterally  Abdomen: Soft, non-distended, non-tender, no rebound, no guarding.  Groin: Normal appearing, pelvis stable   Ext:  Moving b/l upper and lower extremities.  Back: No T/L/S spine tenderness, No palpable runoff/stepoff/deformity    ACCESS / DEVICES:  [x] Peripheral IV  [ ] Central Venous Line	[ ] R	[ ] L	[ ] IJ	[ ] Fem	[ ] SC	Placed:   [ ] Arterial Line		[ ] R	[ ] L	[ ] Fem	[ ] Rad	[ ] Ax	Placed:   [ ] PICC:					[ ] Mediport  [ ] Urinary Catheter,  Date Placed:   [ ] Chest tube: [ ] Right, [ ] Left  [ ] WAGNER/Phong Drains    Labs:  CAPILLARY BLOOD GLUCOSE      POCT Blood Glucose.: 108 mg/dL (30 Sep 2024 20:43)                          13.7   7.64  )-----------( 196      ( 30 Sep 2024 20:40 )             40.6       Auto Neutrophil %: 50.1 % (09-30-24 @ 20:40)  Auto Immature Granulocyte %: 0.4 % (09-30-24 @ 20:40)    09-30    132[L]  |  95[L]  |  14  ----------------------------<  100[H]  4.6   |  22  |  1.3      Calcium: 9.6 mg/dL (09-30-24 @ 20:40)      LFTs:             7.6  | 0.6  | 28       ------------------[60      ( 30 Sep 2024 20:40 )  4.7  | x    | 20          Lipase:75     Amylase:x        Alcohol, Blood: 88 mg/dL (09-30-24 @ 20:40)    Urinalysis Basic - ( 30 Sep 2024 20:40 )    Color: x / Appearance: x / SG: x / pH: x  Gluc: 100 mg/dL / Ketone: x  / Bili: x / Urobili: x   Blood: x / Protein: x / Nitrite: x   Leuk Esterase: x / RBC: x / WBC x   Sq Epi: x / Non Sq Epi: x / Bacteria: x        Alcohol, Blood: 88 mg/dL (09-30-24 @ 20:40)      RADIOLOGY & ADDITIONAL STUDIES:  ---------------------------------------------------------------------------------------    CT Cervical Spine No Cont (09.30.24 @ 21:16)   IMPRESSION:  CT HEAD:  No acute intracranial pathology or hemorrhage. No acute calvarial   fracture.    MAXILLOFACIAL BONES:  No evidence of maxillofacial bony fracture. No evidence of soft tissue   injury.  Chronic right maxillary sinusitis.    CT CERVICAL SPINE:  No acute fracture or subluxation.  Redemonstration of anterior cervical fusion of C4-C7 with intact hardware.  Multilevel degenerative changes and disc bulging contributing to moderate   to severe bilateral foraminal narrowing, detailed above.        ASSESSMENT:  74yM w/ PMHx of HTN, hypothyroid, AFIB on Xarelto, lumbar spinal stenosis s/p lumbar laminectomy, h/o knee surgery, seen as Trauma Alert s/p unwitnessed fall at home onto ceramic tile +HT, +LOC, +AC. Patient states he was feeling dizzy and then lost consciousness, waking up on the floor on his back. Trauma assessment in ED: ABCs intact , GCS 15 , AAOx3. Physical signs of trauma include right posterior scalp hematoma.     Injuries identified:   - Right posterior scalp hematoma    PLAN:   - Trauma imaging negative (CT HEAD, CT CSPINE, MAX/FACE)   - Consider medicine admission for evaluation of recurrent falls due to dizziness/ syncope  - If admitted to medicine, will do tertiary trauma survey 10/1.      Above plan to be discussed with Trauma attending, Dr. Ramos, patient, and ED team  --------------------------------------------------------------------------------------  09-30-24 @ 21:43

## 2024-10-01 PROCEDURE — 99285 EMERGENCY DEPT VISIT HI MDM: CPT

## 2024-10-01 NOTE — ED PROVIDER NOTE - OBJECTIVE STATEMENT
74-year-old male [facility] A-fib on Xarelto, hypothyroidism, presents to the ED for evaluation after a fall.  States he was getting up from his chair when he got lightheaded and fell backwards striking his head.  Positive LOC.  Patient denies nausea or vomiting.  Reports pain to the back of his head.  Patient was able to ambulate about 20 minutes after the fall without assistance.  Reports he has been experiencing lightheadedness for about 6 weeks, and has an appointment with his cardiologist next week.  Patient reports syncopal episode a few months ago, had a Holter monitor placed about 15 months ago, followed up with his EP and was told everything was normal. Denies chest pain or shortness of breath.

## 2024-10-01 NOTE — ED PROVIDER NOTE - PHYSICAL EXAMINATION
CONSTITUTIONAL: ABC intact, GCS 15, NAD  HEAD: Normocephalic. + hematoma to L posterior scalp.  EENT: EOMI, PERRL b/l, no epistaxis, MMM  NECK: C-collar in place, no midline C-spine tenderness/step-offs/deformities, no anterior swelling  CV: RRR, equal distal pulses  RESP: Normal work of breathing, symmetric rise and fall of chest  ABD: Soft, NT, no R/G  EXT: No obvious deformity, no tenderness of extremities, cap refill < 2 seconds  CHEST: No clavicular/chest wall tenderness/deformity/tenting/crepitus  BACK: No midline T/L/S-spine tenderness/step-offs/deformities  PELVIS: No pelvic instability  SKIN: No lacerations, no abrasions  NEURO: AAOx3, no FND (motor strength and sensation grossly intact throughout)  PSYCH: Cooperative, appropriate affect

## 2024-10-01 NOTE — ED PROVIDER NOTE - CLINICAL SUMMARY MEDICAL DECISION MAKING FREE TEXT BOX
Patient presents after syncopal fall.  Positive head trauma on Xarelto.  Trauma alert called.  Labs and imaging done.  No traumatic injuries noted.  Lactate improved after fluids.  Troponin stable.  Patient feeling better.  Shared decision making about staying for syncope evaluation versus outpatient management.  Patient states he has an appoint with his cardiologist this coming week and would prefer to go home and follow-up with his doctor.  States he is feeling much better.  Ambulating in the ED without any dizziness.  Patient discharged with PMD and cardio follow-up and return precautions.

## 2024-10-01 NOTE — ED PROVIDER NOTE - ATTENDING CONTRIBUTION TO CARE
74-year-old male past med history of hypertension, A-fib on Xarelto, hypothyroid, BPH presents after a syncopal fall.  Patient reports that he was sitting in a chair he started feel lightheaded and then was found on the ground.  Feels like he might of passed out.  Did hit the back of his head.  No headache or dizziness.  No nausea vomiting.  Trauma alert called on arrival.  Patient was able to ambulate afterwards.  No chest pain shortness with or palpitations.  Does state that he gets episodes of dizziness for the last couple months.  Has an appoint with his cardiologist next week.  Also reports he has episodes of dehydration.  No fevers or recent illness.    CONSTITUTIONAL: Well-developed; well-nourished; in no acute distress.   SKIN: warm, dry  HEAD: Normocephalic; atraumatic.  EYES: PERRL, EOMI, no conjunctival erythema  ENT: No nasal discharge; airway clear.  NECK: Supple; non tender.  CARD: S1, S2 normal;  Regular rate and rhythm.   RESP: No wheezes, rales or rhonchi.  ABD: soft non tender, non distended, no rebound or guarding  EXT: Normal ROM.  5/5 strength in all 4 extremities   LYMPH: No acute cervical adenopathy.  NEURO: Alert, oriented, grossly unremarkable. neurovascularly intact  PSYCH: Cooperative, appropriate.

## 2024-10-01 NOTE — ED PROVIDER NOTE - NSFOLLOWUPINSTRUCTIONS_ED_ALL_ED_FT
Brief Postoperative Note    Patient: Keanu Vick  YOB: 1965  MRN: [id number]    Date of Procedure: 9/17/2020     Pre-Op Diagnosis: KIDNEY STONES    Post-Op Diagnosis: RIGHT KIDNEY STONE      Procedure(s):  CYSTOSCOPY, RIGHT RETROGRADE PYELOGRAM, RIGHT URETEROSCOPIC STONE EXTRACTION, LASER LITHOTRIPSY WITH HOLMIUIM, STENT PLACEMENT WITH C-ARM    Surgeon(s):  Aguilar Mart MD    Surgical Assistant: Jonny Galdamez  Anesthesia: General     Estimated Blood Loss (mL): Minimal    Complications: None    Specimens:   ID Type Source Tests Collected by Time Destination   1 : RIGHT KIDNEY STONES Fresh Kidney  Aguilar Mart MD 9/17/2020 1404 Pathology        Implants:   Implant Name Type Inv.  Item Serial No.  Lot No. LRB No. Used Action   STENT URET 5FR L24CM PERCFLX HYDR+ DBL PGTL THRD 2 - [id number]  STENT URET 5FR L24CM PERCFLX HYDR+ DBL PGTL THRD 2  [id number][id number] Right 1 Implanted       Drains: * No LDAs found *    Findings: RIGHT lower calyx stone    Electronically Signed by Neeru Brower MD on 9/17/2020 at 2:14 PM Please follow up with your primary care physician and cardiologist.     Syncope    Syncope is when you temporarily lose consciousness, also called fainting or passing out. It is caused by a sudden decrease in blood flow to the brain. Even though most causes of syncope are not dangerous, syncope can possibly be a sign of a serious medical problem. Signs that you may be about to faint include feeling dizzy, lightheaded, nausea, visual changes, or cold/clammy skin. Do not drive, operate heavy machinery, or play sports until your health care provider says it is okay.    SEEK IMMEDIATE MEDICAL CARE IF YOU HAVE ANY OF THE FOLLOWING SYMPTOMS: severe headache, pain in your chest/abdomen/back, bleeding from your mouth or rectum, palpitations, shortness of breath, pain with breathing, seizure, confusion, or trouble walking.

## 2024-10-01 NOTE — ED PROVIDER NOTE - PATIENT PORTAL LINK FT
You can access the FollowMyHealth Patient Portal offered by [facility] by registering at the following website: [email or web address]. By joining [facility]’s FollowMyHealth portal, you will also be able to view your health information using other applications (apps) compatible with our system.

## 2024-10-23 ENCOUNTER — APPOINTMENT (OUTPATIENT)
Facility: CLINIC | Age: 74
End: 2024-10-23
Payer: MEDICARE

## 2024-10-23 DIAGNOSIS — M48.062 SPINAL STENOSIS, LUMBAR REGION WITH NEUROGENIC CLAUDICATION: ICD-10-CM

## 2024-10-23 PROCEDURE — 99213 OFFICE O/P EST LOW 20 MIN: CPT

## 2025-01-22 ENCOUNTER — APPOINTMENT (OUTPATIENT)
Dept: ORTHOPEDIC SURGERY | Facility: CLINIC | Age: 75
End: 2025-01-22

## 2025-03-06 ENCOUNTER — APPOINTMENT (OUTPATIENT)
Dept: NEUROLOGY | Facility: CLINIC | Age: 75
End: 2025-03-06